# Patient Record
Sex: MALE | Race: WHITE | Employment: UNEMPLOYED | URBAN - METROPOLITAN AREA
[De-identification: names, ages, dates, MRNs, and addresses within clinical notes are randomized per-mention and may not be internally consistent; named-entity substitution may affect disease eponyms.]

---

## 2018-07-02 ENCOUNTER — OFFICE VISIT (OUTPATIENT)
Dept: NEUROLOGY | Age: 23
End: 2018-07-02

## 2018-07-02 VITALS
SYSTOLIC BLOOD PRESSURE: 138 MMHG | DIASTOLIC BLOOD PRESSURE: 84 MMHG | HEIGHT: 67 IN | BODY MASS INDEX: 27.62 KG/M2 | OXYGEN SATURATION: 98 % | WEIGHT: 176 LBS | HEART RATE: 110 BPM

## 2018-07-02 DIAGNOSIS — R68.89: Primary | ICD-10-CM

## 2018-07-02 DIAGNOSIS — R41.9 COGNITIVE COMPLAINTS: ICD-10-CM

## 2018-07-02 RX ORDER — CLOZAPINE 100 MG/1
TABLET ORAL
Refills: 3 | COMMUNITY
Start: 2018-05-15

## 2018-07-02 RX ORDER — LEVOTHYROXINE SODIUM 25 UG/1
TABLET ORAL
COMMUNITY
Start: 2018-06-30

## 2018-07-02 NOTE — PROGRESS NOTES
Name:  Jo Ann Wilkinson      :  1995    PCP:   PROVIDER UNKNOWN      Referring:  Self  MRN:   [id number]    Chief Complaint:   Chief Complaint   Patient presents with    Neurologic Problem     abnormal sensation in head       HISTORY OF PRESENT ILLNESS:     This is a 20 y.o. male with hx of Anxiety, Psychosis, Hypothyroid, who presents for evaluation of abnormal sensation inside head. Mother accompanies patient but he provides history. He describes that he used to have a drug problem, meth, but last used 3-4 years ago. Since then had been diagnosed with Psychosis and is now taking Clozapine. He has been having a popping sensation inside his head for the last 3 years but says what brought him to the Neurologist is that for the past 3-4 months he's been have a burning sensation, \"disintegrating sensation,\" inside his head. He says he's discussed this with multiple providers (including his Psychiatrist) but none of them felt there was anything organically wrong. Patient says he wants to be sure he doesn't have an abscess or infection inside his head. Has had headaches in the past, but this is a different sensation. Complete Review of Systems: + anxiety, chest pain, fatigue, joint pain, memory loss, muscle pain, muscle weakness, rash, tinnitus, stomach pain, dysphagia, visual disturbance; otherwise as noted in HPI     Allergies   Allergen Reactions    Peanut Not Reported This Time    Sulfa (Sulfonamide Antibiotics) Not Reported This Time     Past Medical History:   Diagnosis Date    Anxiety     Psychosis     Thyroid disease      Current Outpatient Prescriptions   Medication Sig Dispense Refill    cloZAPine (CLOZARIL) 100 mg tablet TAKE 3 TABLETS BY MOUTH DAILY  3    levothyroxine (SYNTHROID) 25 mcg tablet        History reviewed. No pertinent surgical history.   Family History   Problem Relation Age of Onset    Stroke Father     Cancer Maternal Grandmother      Social History     Social History    Marital status: SINGLE     Spouse name: N/A    Number of children: N/A    Years of education: N/A     Occupational History    Not on file. Social History Main Topics    Smoking status: Current Every Day Smoker     Types: Cigarettes    Smokeless tobacco: Never Used    Alcohol use Yes      Comment: 6-12 beers/week    Drug use: Not on file    Sexual activity: Not on file     Other Topics Concern    Not on file     Social History Narrative    No narrative on file       PHYSICAL EXAM  Vitals:    07/02/18 0812   BP: 138/84   Pulse: (!) 110   SpO2: 98%   Weight: 79.8 kg (176 lb)   Height: 5' 7\" (1.702 m)       General:  Alert, cooperative, NAD     Head:  Normocephalic, atraumatic. Neck: supple, complete range of motion     Eyes:  Conjunctivae/corneas clear   Lungs:  Heart:  Non labored breathing  Mild tachycardia; regular rhythm    Extremities: No edema. Skin: No rashes    Neurologic Exam       Language: normal  Memory:  Alert, oriented to person, place, situation    Cranial Nerves:  I: smell Not tested   II: visual fields Full to confrontation   II: pupils Equal, round, reactive to light   II: optic disc No papilledema on right; difficult to visualize on left due to frequent eye blinking     III,VII: ptosis none   III,IV,VI: extraocular muscles  normal   V: facial light touch sensation  normal   VII: facial muscle function  symmetric   VIII: hearing symmetric   IX: soft palate elevation  Cannot visualize; narrow oropharynx.     XI: sternocleidomastoid strength 5/5   XII: tongue  midline      Motor: normal bulk, tone, strength in all exts  Sensory: intact to LT, PP, temp, vibration x 4 exts   Cerebellar: no rest, postural, or intention tremor  Normal FNF and H-Shin bilaterally  Reflexes: 2+ throughout  Plantar response: neutral bilaterally    Gait: normal gait including tandem  Romberg negative     No outside clinic notes/ imaging reports available for review    ASSESSMENT AND PLAN    ICD-10-CM ICD-9-CM    1. Sensory problem of head R68.89 V48.4 MRI BRAIN W WO CONT   2. Cognitive complaints R41.9 799.59 MRI BRAIN W WO CONT       20 y.o. male with remote hx of drug abuse (last used 4 yrs ago). On Clozapine for psychosis. C/o abnormal sensations in brain. D/w patient and mother that anatomically, we cannot feel problems inside the brain itself though the coverings of the brain have sensory receptors that can relay pain. Will check MRI Brain +/- contrast to rule out structural abnormalities such as abscess, given the history of drug abuse. F/u in 3-4 weeks to go over MRI results.

## 2018-07-02 NOTE — PROGRESS NOTES
Patient states he thinks he has a brain disease. He states it is hard to explain but says \"it feels like something is eating away at my brain\". He has a sharp twinge of pain at times and sometimes burning sensation.

## 2018-07-02 NOTE — MR AVS SNAPSHOT
[address] [facility] [address] [facility] [address] [phone] Patient: Lesvia Madden MRN: [id number] XOCHITL:[birth year] Visit Information Date & Time Provider Department Dept. Phone Encounter #  
 7/2/2018  8:00 AM John Uribe MD [facility] [phone] [id number] Follow-up Instructions Return in about 3 weeks (around 7/23/2018). Upcoming Health Maintenance Date Due DTaP/Tdap/Td series (1 - Tdap) 9/3/2015 Influenza Age 5 to Adult 8/1/2018 Allergies as of 7/2/2018  Review Complete On: 7/2/2018 By: Nima Flores LPN Severity Noted Reaction Type Reactions Peanut  07/02/2018    Not Reported This Time  
 Sulfa (Sulfonamide Antibiotics)    Not Reported This Time Current Immunizations  Never Reviewed No immunizations on file. Not reviewed this visit You Were Diagnosed With   
  
 Codes Comments Sensory problem of head    -  Primary ICD-10-CM: R68.89 ICD-9-CM: V48.4 Cognitive complaints     ICD-10-CM: R41.9 ICD-9-CM: 799.59 Vitals BP Pulse Height(growth percentile) Weight(growth percentile) SpO2 BMI  
 138/84 (!) 110 5' 7\" (1.702 m) 176 lb (79.8 kg) 98% 27.57 kg/m2 Smoking Status Current Every Day Smoker Vitals History BMI and BSA Data Body Mass Index Body Surface Area  
 27.57 kg/m 2 1.94 m 2 Preferred Pharmacy [facility] Phone [facility] [address] [phone] Your Updated Medication List  
  
   
This list is accurate as of 7/2/18  8:38 AM.  Always use your most recent med list.  
  
  
  
  
 cloZAPine 100 mg tablet Commonly known as:  CLOZARIL  
TAKE 3 TABLETS BY MOUTH DAILY  
  
 levothyroxine 25 mcg tablet Commonly known as:  SYNTHROID Follow-up Instructions Return in about 3 weeks (around 7/23/2018). To-Do List   
 07/02/2018 Imaging:  MRI BRAIN W WO CONT Introducing [facility] & HEALTH SERVICES! [employer] introduces [employer] patient portal. Now you can access parts of your medical record, email your doctor's office, and request medication refills online. 1. In your internet browser, go to [email or web address] [email or web address] 2. Click on the First Time User? Click Here link in the Sign In box. You will see the New Member Sign Up page. 3. Enter your [employer] Access Code exactly as it appears below. You will not need to use this code after youve completed the sign-up process. If you do not sign up before the expiration date, you must request a new code. · [employer] Access Code: [email or web address] Expires: 9/30/2018  8:38 AM 
 
4. Enter the last four digits of your Social Security Number (xxxx) and Date of Birth (mm/dd/yyyy) as indicated and click Submit. You will be taken to the next sign-up page. 5. Create a Net Orange ID. This will be your Net [employer] login ID and cannot be changed, so think of one that is secure and easy to remember. 6. Create a [employer] password. You can change your password at any time. 7. Enter your Password Reset Question and Answer. This can be used at a later time if you forget your password. 8. Enter your e-mail address. You will receive e-mail notification when new information is available in [address] 9. Click Sign Up. You can now view and download portions of your medical record. 10. Click the Download Summary menu link to download a portable copy of your medical information. If you have questions, please visit the Frequently Asked Questions section of the [employer] website. Remember, [employer] is NOT to be used for urgent needs. For medical emergencies, dial 911. Now available from your iPhone and Android! Please provide this summary of care documentation to your next provider. Your primary care clinician is listed as PROVIDER UNKNOWN.  If you have any questions after today's visit, please call [phone].

## 2018-07-09 ENCOUNTER — HOSPITAL ENCOUNTER (OUTPATIENT)
Dept: MRI IMAGING | Age: 23
Discharge: HOME | End: 2018-07-09
Attending: PSYCHIATRY & NEUROLOGY
Payer: COMMERCIAL

## 2018-07-09 DIAGNOSIS — R68.89: ICD-10-CM

## 2018-07-09 DIAGNOSIS — R41.9 COGNITIVE COMPLAINTS: ICD-10-CM

## 2018-07-09 PROCEDURE — 74011250636 HC RX REV CODE- 250/636: Performed by: PSYCHIATRY & NEUROLOGY

## 2018-07-09 PROCEDURE — 70553 MRI BRAIN STEM W/O & W/DYE: CPT

## 2018-07-09 PROCEDURE — A9585 GADOBUTROL INJECTION: HCPCS | Performed by: PSYCHIATRY & NEUROLOGY

## 2018-07-09 RX ADMIN — GADOBUTROL 7 ML: 604.72 INJECTION INTRAVENOUS at 20:37

## 2018-07-30 ENCOUNTER — OFFICE VISIT (OUTPATIENT)
Dept: NEUROLOGY | Age: 23
End: 2018-07-30

## 2018-07-30 VITALS
BODY MASS INDEX: 27.94 KG/M2 | OXYGEN SATURATION: 98 % | WEIGHT: 178 LBS | DIASTOLIC BLOOD PRESSURE: 86 MMHG | SYSTOLIC BLOOD PRESSURE: 140 MMHG | HEART RATE: 127 BPM | HEIGHT: 67 IN

## 2018-07-30 DIAGNOSIS — R68.89: Primary | ICD-10-CM

## 2018-07-30 NOTE — MR AVS SNAPSHOT
[address] [place] [address] [address] [facility] [address] [facility] [address] [phone] Patient: Tricia Santos MRN: [id number] TBS:[birth year] Visit Information Date & Time Provider Department Dept. Phone Encounter #  
 7/30/2018  2:20 PM MD Amira Lopeza [facility] [phone] [id number] Follow-up Instructions Return if symptoms worsen or fail to improve. Upcoming Health Maintenance Date Due Pneumococcal 19-64 Medium Risk (1 of 1 - PPSV23) 9/3/2013 DTaP/Tdap/Td series (1 - Tdap) 9/3/2015 Influenza Age 5 to Adult 8/1/2018 Allergies as of 7/30/2018  Review Complete On: 7/30/2018 By: Isa Baird LPN Severity Noted Reaction Type Reactions Peanut  07/02/2018    Not Reported This Time  
 Sulfa (Sulfonamide Antibiotics)    Not Reported This Time Current Immunizations  Never Reviewed No immunizations on file. Not reviewed this visit You Were Diagnosed With   
  
 Codes Comments Sensory problem of head    -  Primary ICD-10-CM: R68.89 ICD-9-CM: V48.4 Vitals BP Pulse Height(growth percentile) Weight(growth percentile) SpO2 BMI  
 140/86 (!) 127 5' 7\" (1.702 m) 178 lb (80.7 kg) 98% 27.88 kg/m2 Smoking Status Current Every Day Smoker Vitals History BMI and BSA Data Body Mass Index Body Surface Area  
 27.88 kg/m 2 1.95 m 2 [facility]erred Pharmacy [facility] [address] [phone] Your Updated Medication List  
  
   
This list is accurate as of 7/30/18  2:33 PM.  Always use your most recent med list.  
  
  
  
  
 cloZAPine 100 mg tablet Commonly known as:  CLOZARIL  
TAKE 3 TABLETS BY MOUTH DAILY  
  
 levothyroxine 25 mcg tablet Commonly known as:  SYNTHROID Follow-up Instructions Return if symptoms worsen or fail to improve. Introducing [facility] & HEALTH SERVICES! Dear Denis Farmer: Thank you for requesting a [employer] account. Our records indicate that you already have an active [employer] account. You can access your account anytime at [email or web address] Did you know that you can access your hospital and ER discharge instructions at any time in [employer]? You can also review all of your test results from your hospital stay or ER visit. Additional Information If you have questions, please visit the Frequently Asked Questions section of the [employer] website at [email or web address] Remember, [employer] is NOT to be used for urgent needs. For medical emergencies, dial 911. Now available from your iPhone and Android! Please provide this summary of care documentation to your next provider. Your primary care clinician is listed as NONE. If you have any questions after today's visit, please call [phone].

## 2018-07-30 NOTE — PROGRESS NOTES
Interval HPI:   This is a 20 y.o. male who is following up for     Chief Complaint   Patient presents with    Results     Following up to go over Brain MRI results regarding abnormal sensations inside head (inside brain per patient). See initial note on 7-2 for full details. Reviewed MRI Venus Fernandez report and images with patient and mother. Normal MRI Brain with and without contrast. Pt says he occasionally has the burning sensation inside his head but it's not as often now. He confirms that this isn't a headache complaint. He denies any other neurologic symptoms. Brief ROS:  As above or otherwise negative. Past Medical History:   Diagnosis Date    Anxiety     Psychosis 2015    Thyroid disease        History reviewed. No pertinent surgical history. Family History   Problem Relation Age of Onset    Stroke Father     Cancer Maternal Grandmother        Social History     Social History    Marital status: SINGLE     Spouse name: N/A    Number of children: N/A    Years of education: N/A     Occupational History    Not on file. Social History Main Topics    Smoking status: Current Every Day Smoker     Types: Cigarettes    Smokeless tobacco: Never Used    Alcohol use Yes      Comment: 6-12 beers/week    Drug use: Not on file    Sexual activity: Not on file     Other Topics Concern    Not on file     Social History Narrative     Allergies   Allergen Reactions    Peanut Not Reported This Time    Sulfa (Sulfonamide Antibiotics) Not Reported This Time     Current Outpatient Prescriptions   Medication Sig Dispense Refill    cloZAPine (CLOZARIL) 100 mg tablet TAKE 3 TABLETS BY MOUTH DAILY  3    levothyroxine (SYNTHROID) 25 mcg tablet          Physical Exam  Blood pressure 140/86, pulse (!) 127, height 5' 7\" (1.702 m), weight 80.7 kg (178 lb), SpO2 98 %. No acute distress  Neck: no stiffness  Extremities: no edema    Mental status: Alert and oriented to person, place situation. CNs:  Visual Fields: grossly normal bilaterally  EOM: intact/ conjugate in all directions  Facial strength: symmetric  Hearing: normal to conversation   Sensory: intact light touch    Motor: moves all extremities freely (at least 4+/ 5)  Reflexes: not examined  Gait: not examined    Impression      ICD-10-CM ICD-9-CM    1. Sensory problem of head R68.89 V48.4        Discussed with pt/ mother that MRI Brain is normal  No neurologic etiology to patients intracranial pain/ paresthesia complaints  He expressed understanding and didn't have any questions  Encouraged him to keep working with Psychiatry  No regular neurology follow up needed.

## 2022-03-19 PROBLEM — R41.9 COGNITIVE COMPLAINTS: Status: ACTIVE | Noted: 2018-07-02

## 2022-03-20 PROBLEM — R68.89: Status: ACTIVE | Noted: 2018-07-02

## 2023-01-30 ENCOUNTER — HOSPITAL ENCOUNTER (INPATIENT)
Age: 28
LOS: 5 days | Discharge: HOME | DRG: 750 | End: 2023-02-06
Attending: EMERGENCY MEDICINE | Admitting: INTERNAL MEDICINE
Payer: MEDICAID

## 2023-01-30 DIAGNOSIS — U07.1 LAB TEST POSITIVE FOR DETECTION OF COVID-19 VIRUS: ICD-10-CM

## 2023-01-30 DIAGNOSIS — F22 PARANOIA (PSYCHOSIS) (HCC): Primary | ICD-10-CM

## 2023-01-30 LAB
ALBUMIN SERPL-MCNC: 4.2 G/DL (ref 3.5–5)
ALBUMIN/GLOB SERPL: 1.4 (ref 1.1–2.2)
ALP SERPL-CCNC: 70 U/L (ref 45–117)
ALT SERPL-CCNC: 23 U/L (ref 12–78)
AMPHET UR QL SCN: NEGATIVE
ANION GAP SERPL CALC-SCNC: 14 MMOL/L (ref 5–15)
APPEARANCE UR: CLEAR
AST SERPL-CCNC: 22 U/L (ref 15–37)
BACTERIA URNS QL MICRO: NEGATIVE /HPF
BARBITURATES UR QL SCN: NEGATIVE
BASOPHILS # BLD: 0.1 K/UL (ref 0–0.1)
BASOPHILS NFR BLD: 1 % (ref 0–1)
BENZODIAZ UR QL: NEGATIVE
BILIRUB SERPL-MCNC: 1.9 MG/DL (ref 0.2–1)
BILIRUB UR QL: NEGATIVE
BUN SERPL-MCNC: 12 MG/DL (ref 6–20)
BUN/CREAT SERPL: 12 (ref 12–20)
CALCIUM SERPL-MCNC: 8.6 MG/DL (ref 8.5–10.1)
CANNABINOIDS UR QL SCN: NEGATIVE
CHLORIDE SERPL-SCNC: 100 MMOL/L (ref 97–108)
CO2 SERPL-SCNC: 23 MMOL/L (ref 21–32)
COCAINE UR QL SCN: NEGATIVE
COLOR UR: ABNORMAL
CREAT SERPL-MCNC: 1.03 MG/DL (ref 0.7–1.3)
DIFFERENTIAL METHOD BLD: ABNORMAL
DRUG SCRN COMMENT,DRGCM: NORMAL
EOSINOPHIL # BLD: 0 K/UL (ref 0–0.4)
EOSINOPHIL NFR BLD: 0 % (ref 0–7)
EPITH CASTS URNS QL MICRO: ABNORMAL /LPF
ERYTHROCYTE [DISTWIDTH] IN BLOOD BY AUTOMATED COUNT: 12.1 % (ref 11.5–14.5)
ETHANOL SERPL-MCNC: <10 MG/DL
FLUAV RNA SPEC QL NAA+PROBE: NOT DETECTED
FLUBV RNA SPEC QL NAA+PROBE: NOT DETECTED
GLOBULIN SER CALC-MCNC: 3.1 G/DL (ref 2–4)
GLUCOSE SERPL-MCNC: 85 MG/DL (ref 65–100)
GLUCOSE UR STRIP.AUTO-MCNC: NEGATIVE MG/DL
HCT VFR BLD AUTO: 45.8 % (ref 36.6–50.3)
HGB BLD-MCNC: 15.9 G/DL (ref 12.1–17)
HGB UR QL STRIP: NEGATIVE
IMM GRANULOCYTES # BLD AUTO: 0 K/UL (ref 0–0.04)
IMM GRANULOCYTES NFR BLD AUTO: 0 % (ref 0–0.5)
KETONES UR QL STRIP.AUTO: 40 MG/DL
LEUKOCYTE ESTERASE UR QL STRIP.AUTO: NEGATIVE
LYMPHOCYTES # BLD: 2.3 K/UL (ref 0.8–3.5)
LYMPHOCYTES NFR BLD: 29 % (ref 12–49)
MCH RBC QN AUTO: 31 PG (ref 26–34)
MCHC RBC AUTO-ENTMCNC: 34.7 G/DL (ref 30–36.5)
MCV RBC AUTO: 89.3 FL (ref 80–99)
METHADONE UR QL: NEGATIVE
MONOCYTES # BLD: 1.1 K/UL (ref 0–1)
MONOCYTES NFR BLD: 14 % (ref 5–13)
NEUTS SEG # BLD: 4.5 K/UL (ref 1.8–8)
NEUTS SEG NFR BLD: 56 % (ref 32–75)
NITRITE UR QL STRIP.AUTO: NEGATIVE
NRBC # BLD: 0 K/UL (ref 0–0.01)
NRBC BLD-RTO: 0 PER 100 WBC
OPIATES UR QL: NEGATIVE
PCP UR QL: NEGATIVE
PH UR STRIP: 6 (ref 5–8)
PLATELET # BLD AUTO: 330 K/UL (ref 150–400)
PMV BLD AUTO: 10.7 FL (ref 8.9–12.9)
POTASSIUM SERPL-SCNC: 3.7 MMOL/L (ref 3.5–5.1)
PROT SERPL-MCNC: 7.3 G/DL (ref 6.4–8.2)
PROT UR STRIP-MCNC: NEGATIVE MG/DL
RBC # BLD AUTO: 5.13 M/UL (ref 4.1–5.7)
RBC #/AREA URNS HPF: ABNORMAL /HPF (ref 0–5)
SARS-COV-2, COV2: DETECTED
SODIUM SERPL-SCNC: 137 MMOL/L (ref 136–145)
SP GR UR REFRACTOMETRY: <1.005
UA: UC IF INDICATED,UAUC: ABNORMAL
UROBILINOGEN UR QL STRIP.AUTO: 0.2 EU/DL (ref 0.2–1)
WBC # BLD AUTO: 7.9 K/UL (ref 4.1–11.1)
WBC URNS QL MICRO: ABNORMAL /HPF (ref 0–4)

## 2023-01-30 PROCEDURE — 99285 EMERGENCY DEPT VISIT HI MDM: CPT

## 2023-01-30 PROCEDURE — 80053 COMPREHEN METABOLIC PANEL: CPT

## 2023-01-30 PROCEDURE — 74011000250 HC RX REV CODE- 250: Performed by: PHYSICIAN ASSISTANT

## 2023-01-30 PROCEDURE — 36415 COLL VENOUS BLD VENIPUNCTURE: CPT

## 2023-01-30 PROCEDURE — 87636 SARSCOV2 & INF A&B AMP PRB: CPT

## 2023-01-30 PROCEDURE — 81001 URINALYSIS AUTO W/SCOPE: CPT

## 2023-01-30 PROCEDURE — 74011250636 HC RX REV CODE- 250/636: Performed by: PHYSICIAN ASSISTANT

## 2023-01-30 PROCEDURE — 82077 ASSAY SPEC XCP UR&BREATH IA: CPT

## 2023-01-30 PROCEDURE — 85025 COMPLETE CBC W/AUTO DIFF WBC: CPT

## 2023-01-30 PROCEDURE — 80307 DRUG TEST PRSMV CHEM ANLYZR: CPT

## 2023-01-30 PROCEDURE — 96372 THER/PROPH/DIAG INJ SC/IM: CPT

## 2023-01-30 RX ORDER — LORAZEPAM 1 MG/1
2 TABLET ORAL
Status: DISPENSED | OUTPATIENT
Start: 2023-01-30 | End: 2023-01-31

## 2023-01-30 RX ADMIN — OLANZAPINE 10 MG: 10 INJECTION, POWDER, FOR SOLUTION INTRAMUSCULAR at 13:50

## 2023-01-30 NOTE — ED NOTES
Pt reports it feels like a piece of his body is severed and missing. He also reports it feels like his body is splitting completely in two. Pt does not appear to be receptive to redirection that he is in once piece.  Pt remains in handcuffs with [employer] at bedside

## 2023-01-30 NOTE — ED TRIAGE NOTES
Pt arrived to ED accompanied by [facility] [employer] under ECO. Per [employer], pt's mom petitioned for an ECO d/t pt increased paranoia. Pt has a hx of schizophrenia. Per mom, pt has stopped taking his clozapine d/t having to get his blood drawn frequently and believing they were injecting poison into his blood. Pt apprehensive with staff. Pt states he only eats home made d/t being scared of being poisoned. Pt denies SI/HI/AVH.

## 2023-01-30 NOTE — ED NOTES
Pt would not take verbal command from officer to stay in his bed and in his room. Officer placed patient in forensic restraints.  Left wrist is handcuffed at his wrist and cuffed to bed by officer along with the right wrist handcuffed at wrist and to the bed by to bed by officer

## 2023-01-30 NOTE — ED NOTES
Pt hands bilateral wrist in handcuffs in the front of body. [employer] at bedside. Nursing supervisor and security made aware. Skin around handcuffs is intact, dry, and no signs of redness or breakdown. \"Metal restraint\" in use sign up on door.

## 2023-01-30 NOTE — ED NOTES
Pt refusing to take PO med. Pt stating \"I can't take medicine, they are here to take me\" and \"I wont take that until I speak to a \". Provider aware.

## 2023-01-30 NOTE — ED PROVIDER NOTES
[facility] EMERGENCY DEPT  EMERGENCY DEPARTMENT ENCOUNTER       Pt Name: Gordon Dai  MRN: [id number]  [place] 1995  Date of evaluation: 1/30/2023  Provider: Tiffanie Gomez PA-C   PCP: None  Note Started: 12:55 PM 1/30/23     CHIEF COMPLAINT       Chief Complaint   Patient presents with    Mental Health Problem        HISTORY OF PRESENT ILLNESS: 1 or more elements      History From: Patient and ECO paperwork  HPI Limitations : None     Gordon Dai is a 28 y.o. male who presents as an ECO here for medical clearance. Patient's mother issued a ECO stating that patient had been very paranoid and stopped taking his Clozaril. She states with the Clozaril patient had to get regular lab checks and he started to feel like people were poisoning his blood. She also reports patient called the police recently stating he had blood all over his sheets and that his parents had killed people and were eating them. Police did not take him anywhere as he was not a threat to himself     Nursing Notes were all reviewed and agreed with or any disagreements were addressed in the HPI. REVIEW OF SYSTEMS      Review of Systems   Unable to perform ROS: Mental status change      Positives and Pertinent negatives as per HPI. PAST HISTORY     Past Medical History:  Past Medical History:   Diagnosis Date    Anxiety     Psychosis ([address] 2015    Thyroid disease        Past Surgical History:  No past surgical history on file. Family History:  Family History   Problem Relation Age of Onset    Stroke Father     Cancer Maternal Grandmother        Social History:  Social History     Tobacco Use    Smoking status: Every Day     Types: Cigarettes    Smokeless tobacco: Never   Substance Use Topics    Alcohol use: Yes     Comment: 6-12 beers/week       Allergies:   Allergies   Allergen Reactions    Peanut Not Reported This Time    Sulfa (Sulfonamide Antibiotics) Not Reported This Time       CURRENT MEDICATIONS      Previous Medications CLOZAPINE (CLOZARIL) 100 MG TABLET    TAKE 3 TABLETS BY MOUTH DAILY    LEVOTHYROXINE (SYNTHROID) 25 MCG TABLET           PHYSICAL EXAM      ED Triage Vitals [01/30/23 1135]   ED Encounter Vitals Group      /87      Pulse (Heart Rate) (!) 117      Resp Rate 16      Temp 97.7 °F (36.5 °C)      Temp src       O2 Sat (%) 100 %      Weight 180 lb      Height 5' 7\"        Physical Exam       EMERGENCY DEPARTMENT COURSE and DIFFERENTIAL DIAGNOSIS/MDM   Vitals:    Vitals:    01/30/23 1135 01/30/23 1309   BP: 102/87 (!) 145/78   Pulse: (!) 117 (!) 104   Resp: 16 16   Temp: 97.7 °F (36.5 °C)    SpO2: 100% 100%   Weight: 81.6 kg (180 lb)    Height: 5' 7\" (1.702 m)         Patient was given the following medications:  Medications   LORazepam (ATIVAN) tablet 2 mg (2 mg Oral Refused 1/30/23 1311)   OLANZapine (ZyPREXA) 10 mg in sterile water (preservative free) 2 mL injection (10 mg IntraMUSCular Given 1/30/23 1350)       Chronic Conditions: pyschosis, anxiety    Social Determinants affecting Dx or Tx: None    Records Reviewed (source and summary of external records): Nursing notes and crisis  paperwork. MDM: (CC/HPI Summary, DDx, ED Course, and Reassessment, Tests not done, Shared Decision Making, Pt Expectation of Test or Tx )   Patient presents to the ED as an ECO here for medical clearance. His mother if she ECO due to patient's constant paranoia in the last week and noncompliance with medication. Mom states patient is supposed to be taking Clozaril and has to get regular labs drawn but he feels that he is getting poisoned when he gets his labs drawn so he stopped taking his medication. Patient called the police a few days ago stating his parents were killing people and eating them. Mom states patient has been looking under the bed, in the hallways, outdoors continuously thinking someone is coming to get him or his family. Patient does deny SI/HI as well as auditory hallucinations.   We will get basic labs for medical clearance including COVID. ED Course as of 01/30/23 1623   Mon Jan 30, 2023   1301 Per RN pt getting agitated and is now handcuffed to the bed. Will give a PO dose of ativan as pt states he is willing to take []   1310 SARS-CoV-2 by PCR(!): Detected  Discussed results with Dhruv Saenz she states she will call pt's mom to see if he has any knowledge of pt being previously positive []   1329 Per Dhruv Saenz, pt's mom tested positive 2 wks ago. Pt will likely have to be hold in the ED because he needs a locked unit, behavioral health can not take them until 10 days after positive test date and 2nd floor is not a locked unit []      ED Course User Index  [] Cara Palomo PA-C       CONSULTS: (Who and What was discussed)  None        DIAGNOSTIC RESULTS   LABS:     Recent Results (from the past 12 hour(s))   CBC WITH AUTOMATED DIFF    Collection Time: 01/30/23 12:02 PM   Result Value Ref Range    WBC 7.9 4.1 - 11.1 K/uL    RBC 5.13 4.10 - 5.70 M/uL    HGB 15.9 12.1 - 17.0 g/dL    HCT 45.8 36.6 - 50.3 %    MCV 89.3 80.0 - 99.0 FL    MCH 31.0 26.0 - 34.0 PG    MCHC 34.7 30.0 - 36.5 g/dL    RDW 12.1 11.5 - 14.5 %    PLATELET 095 452 - 568 K/uL    MPV 10.7 8.9 - 12.9 FL    NRBC 0.0 0  WBC    ABSOLUTE NRBC 0.00 0.00 - 0.01 K/uL    NEUTROPHILS 56 32 - 75 %    LYMPHOCYTES 29 12 - 49 %    MONOCYTES 14 (H) 5 - 13 %    EOSINOPHILS 0 0 - 7 %    BASOPHILS 1 0 - 1 %    IMMATURE GRANULOCYTES 0 0.0 - 0.5 %    ABS. NEUTROPHILS 4.5 1.8 - 8.0 K/UL    ABS. LYMPHOCYTES 2.3 0.8 - 3.5 K/UL    ABS. MONOCYTES 1.1 (H) 0.0 - 1.0 K/UL    ABS. EOSINOPHILS 0.0 0.0 - 0.4 K/UL    ABS. BASOPHILS 0.1 0.0 - 0.1 K/UL    ABS. IMM.  GRANS. 0.0 0.00 - 0.04 K/UL    DF AUTOMATED     METABOLIC PANEL, COMPREHENSIVE    Collection Time: 01/30/23 12:02 PM   Result Value Ref Range    Sodium 137 136 - 145 mmol/L    Potassium 3.7 3.5 - 5.1 mmol/L    Chloride 100 97 - 108 mmol/L    CO2 23 21 - 32 mmol/L    Anion gap 14 5 - 15 mmol/L    Glucose 85 65 - 100 mg/dL    BUN 12 6 - 20 MG/DL    Creatinine 1.03 0.70 - 1.30 MG/DL    BUN/Creatinine ratio 12 12 - 20      eGFR >60 >60 ml/min/1.73m2    Calcium 8.6 8.5 - 10.1 MG/DL    Bilirubin, total 1.9 (H) 0.2 - 1.0 MG/DL    ALT (SGPT) 23 12 - 78 U/L    AST (SGOT) 22 15 - 37 U/L    Alk. phosphatase 70 45 - 117 U/L    Protein, total 7.3 6.4 - 8.2 g/dL    Albumin 4.2 3.5 - 5.0 g/dL    Globulin 3.1 2.0 - 4.0 g/dL    A-G Ratio 1.4 1.1 - 2.2     ETHYL ALCOHOL    Collection Time: 01/30/23 12:02 PM   Result Value Ref Range    ALCOHOL(ETHYL),SERUM <10 <10 MG/DL   DRUG SCREEN, URINE    Collection Time: 01/30/23 12:02 PM   Result Value Ref Range    AMPHETAMINES Negative NEG      BARBITURATES Negative NEG      BENZODIAZEPINES Negative NEG      COCAINE Negative NEG      METHADONE Negative NEG      OPIATES Negative NEG      PCP(PHENCYCLIDINE) Negative NEG      THC (TH-CANNABINOL) Negative NEG      Drug screen comment (NOTE)    COVID-19 WITH INFLUENZA A/B    Collection Time: 01/30/23 12:02 PM   Result Value Ref Range    SARS-CoV-2 by PCR Detected (A) NOTD      Influenza A by PCR Not detected      Influenza B by PCR Not detected     URINALYSIS W/ REFLEX CULTURE    Collection Time: 01/30/23 12:07 PM    Specimen: Urine   Result Value Ref Range    Color YELLOW/STRAW      Appearance CLEAR CLEAR      Specific gravity <1.005     pH (UA) 6.0 5.0 - 8.0      Protein Negative NEG mg/dL    Glucose Negative NEG mg/dL    Ketone 40 (A) NEG mg/dL    Bilirubin Negative NEG      Blood Negative NEG      Urobilinogen 0.2 0.2 - 1.0 EU/dL    Nitrites Negative NEG      Leukocyte Esterase Negative NEG      WBC 0-4 0 - 4 /hpf    RBC 0-5 0 - 5 /hpf    Epithelial cells FEW FEW /lpf    Bacteria Negative NEG /hpf    UA:UC IF INDICATED CULTURE NOT INDICATED BY UA RESULT CNI          EKG: When ordered, EKG's are interpreted by the Emergency Department Physician in the absence of a cardiologist.  Please see their note for interpretation of EKG.          PROCEDURES Unless otherwise noted below, none  Procedures     CRITICAL CARE TIME   none    FINAL IMPRESSION     1. Paranoia (psychosis) ([facility] [address]    2. Lab test positive for detection of COVID-19 virus          DISPOSITION/PLAN   [facility]    ED attending involvment: I have seen and evaluated the patient. My supervision physician was available for consultation. I am the Primary Clinician of Record. Nehemias Acuna PA-C (electronically signed)    (Please note that parts of this dictation were completed with voice recognition software. Quite often unanticipated grammatical, syntax, homophones, and other interpretive errors are inadvertently transcribed by the computer software. Please disregards these errors.  Please excuse any errors that have escaped final proofreading.)

## 2023-01-30 NOTE — ED NOTES
Pt appears more awake. Pt requesting to be let out of handcuffs. Noted that pt has a pattern of attempting to run out of ED and will remain in handcuffs. Pt provided hot meal tray and will attempt to get pt to eat and drink .

## 2023-01-30 NOTE — ED NOTES
Verbal shift change report given to Patricia Roper 44 (oncoming nurse) by Lonny Ramsey RN (offgoing nurse). Report included the following information SBAR, Kardex, ED Summary, Procedure Summary, MAR, and Recent Results.

## 2023-01-30 NOTE — ED NOTES
Emergency Department Nursing Plan of Care       The Nursing Plan of Care is developed from the Nursing assessment and Emergency Department Attending provider initial evaluation. The plan of care may be reviewed in the ED Provider note.     The Plan of Care was developed with the following considerations:   Patient / Family readiness to learn indicated by:verbalized understanding  Persons(s) to be included in education: patient  Barriers to Learning/Limitations:No    Signed     Colbert Goldmann, RN    1/30/2023   2:44 PM

## 2023-01-31 PROCEDURE — 74011250637 HC RX REV CODE- 250/637: Performed by: EMERGENCY MEDICINE

## 2023-01-31 RX ORDER — RISPERIDONE 1 MG/ML
2 SOLUTION ORAL 2 TIMES DAILY
Status: DISCONTINUED | OUTPATIENT
Start: 2023-01-31 | End: 2023-02-06 | Stop reason: HOSPADM

## 2023-01-31 RX ORDER — LEVOTHYROXINE SODIUM 25 UG/1
25 TABLET ORAL DAILY
Status: DISCONTINUED | OUTPATIENT
Start: 2023-02-01 | End: 2023-02-06 | Stop reason: HOSPADM

## 2023-01-31 RX ADMIN — RISPERIDONE 2 MG: 1 SOLUTION ORAL at 18:29

## 2023-01-31 RX ADMIN — RISPERIDONE 2 MG: 1 SOLUTION ORAL at 10:26

## 2023-01-31 NOTE — ED NOTES
Verbal shift change report given to [phone] (oncoming nurse) by Nallely Urbina RN (offgoing nurse). Report included the following information SBAR, Kardex, ED Summary, Procedure Summary, MAR, and Recent Results.

## 2023-01-31 NOTE — ED NOTES
Pt in bed watching tv jingling his cuffs; when asked if he needs medications to calm him down pt states, \"Just leave me to listen to the tv alone. \"  Pt bilateral cuffs to L and R wrist. No signs of injury noted

## 2023-01-31 NOTE — BSMART NOTE
BSMART Liaison Team Note     LOS:  0     Patient goal(s) for today: take medication as prescribed, make needs known in an appropriate manner, utilize coping skills  BSMART Liaison team focus/goals: assess needs, provide support and education, coordinate with care management    Progress note: Patient assessed face to face. He was observed laying in bed with his left wrist handcuffed to the bed. Patient reports that he does not understand why he needs to be handcuffed to the bed if he is in his cell. He was reminded he was in the hospital but he continues to refer to the ER room as a cell. He is a poor historian and is tangential and disorganized. He does not always answer questions appropriately. He does report that he has had a condition for 16 years but is unable to verbalize his diagnosis. He reports medications and taking them then reports not taking them because he forgets or does not like them. Patient denies SI, HI, and hallucinations then reports that he hears voices all the time and asked for books to read to try to quiet the voices down. Patient is unable to give medication names, provider names, or diagnosis. When asked why he is at the hospital he reports that he knows he needed to get some treatment. Discussed case with Larissa at [facility]. She reports mother reported that patient was seeing Dr Olga Painter most recently (over 6 months ago). Last prescription was for risperidone and patient reported feeling dizzy but did not take it very long to see if it worked or if he felt better. This writer discussed with Dr Miguel Colby and Dr Fan Zayas. Medication discussed for daily and PRN use. Dr Fan Zayas and/or Dr Miguel Colby to order medications. Barriers to Discharge: COVID+, CTC TDO  Guns in the home: unknown     Outpatient provider(s):  None at this time.  Previously Dr Olga Painter but this was more than 6 months ago  Insurance info/prescription coverage:  unknown    Diagnosis: Psychosis NOS  Past Medical History: Diagnosis Date    Anxiety     Psychosis ([facility] [address] 2015    Thyroid disease         Plan:  [facility] to continue bed searching. CTC bed searching during the day. Due to COVID+ status, patient is exclusionary for all facilities. Dr Nadeem Ball to start medication and order PRNs. Liaison to team to follow daily while admitted to [facility]. Follow up Psych Consult placed? no.   Psychiatrist updated?  yes       Participating treatment team members: Jahaira Crane, Dr Mikhail Luz MD

## 2023-01-31 NOTE — ED NOTES
RN spoke to [facility] regarding update. Still working on a bed search for pt but my be difficult d/t him having Covid. Per [facility] psych MD will re-evaluate him tomorrow morning.

## 2023-01-31 NOTE — ED NOTES
Pt's left wrist in a handcuff attached to the stretcher. [employer] at bedside. Nursing supervisor and security made aware. Skin around handcuffs is intact, dry, and no signs of redness or breakdown. \"Metal restraint\" in use sign up on door.

## 2023-01-31 NOTE — CONSULTS
PSYCHIATRY CONSULT NOTE:    REASON FOR CONSULT:  psychotic behavior  Delusional    HISTORY OF PRESENTING COMPLAINT:  Elissa Castorena is a 28 y.o. male who presents as an ECO here for medical clearance. Patient's mother issued a ECO stating that patient had been very paranoid and stopped taking his Clozaril. She states with the Clozaril patient had to get regular lab checks and he started to feel like people were poisoning his blood. She also reports patient called the police recently stating he had blood all over his sheets and that his parents had killed people and were eating them. Police did not take him anywhere as he was not a threat to himself    Elissa Castorena reports feeling that he could not eat with his hand cuffed to the bed (food partially eaten). Poor historian, he believes he is in a cell due to the hand cuffs. Notes dealing with mental health concerns for 16+ years. Denies SI/HI/AH/VH. Wants to read a Bible and any books to quiet the voice he occasionally hears. No aggression or violence. Appropriately interactive and limited awareness. Tolerating medications well. Eating and sleeping fairly. PAST PSYCHIATRIC HISTORY:  In Patient Paranoid Schizophrenia on Clozaril. Followed outpatient with Dr. Governor Valerio over 6 months ago.     SUBSTANCE ABUSE HISTORY:  Social History     Substance and Sexual Activity   Drug Use Not on file     Social History     Substance and Sexual Activity   Alcohol Use Yes    Comment: 6-12 beers/week     Social History     Tobacco Use   Smoking Status Every Day    Types: Cigarettes   Smokeless Tobacco Never       PAST MEDICAL HISTORY:  Past Medical History:   Diagnosis Date    Anxiety     Psychosis ([facility] [address] 2015    Thyroid disease        SOCIAL HISTORY:  Lives with mother    VITALS:  Visit Vitals  /71   Pulse 99   Temp 97.7 °F (36.5 °C)   Resp 20   Ht 5' 7\" (1.702 m)   Wt 81.6 kg (180 lb)   SpO2 100%   BMI 28.19 kg/m²       MEDICATIONS:    Current Facility-Administered Medications:     risperiDONE (RisperDAL) 1 mg/mL oral solution soln 2 mg, 2 mg, Oral, BID, Florencio Wylie MD, 2 mg at 01/31/23 1026    Current Outpatient Medications:     cloZAPine (CLOZARIL) 100 mg tablet, TAKE 3 TABLETS BY MOUTH DAILY, Disp: , Rfl: 3    levothyroxine (SYNTHROID) 25 mcg tablet, , Disp: , Rfl:     MENTAL STATUS EXAM:  Calm and cooperative by disorganized. Person/place only  Denies SI/HI/AH/VH  Disorganized    ASSESSMENT AND PLAN:  Paranoid Schizophrenia, Deferred , Problems related to social environment  and 41-50 serious symptoms    RECOMMENDATIONS:  Not a candidate for inpatient psychiatric unit at this time due to his COVID status. Discontinue 1:1 nursing  Restart home medications accept Clozaril.   Risperdal liquid 2 mg PO BID  Thank you for this consultation    Kvng Paige MD  1/31/2023

## 2023-01-31 NOTE — ED NOTES
Bedside shift change report given to Veronica Mills (oncoming nurse) by Pedro Hoang RN (offgoing nurse). Report included the following information SBAR, Kardex, ED Summary, [facility] and Recent Results.

## 2023-02-01 PROBLEM — F23 ACUTE PSYCHOSIS (HCC): Status: ACTIVE | Noted: 2023-02-01

## 2023-02-01 PROBLEM — U07.1 COVID-19: Status: ACTIVE | Noted: 2023-02-01

## 2023-02-01 PROCEDURE — 74011250637 HC RX REV CODE- 250/637: Performed by: PSYCHIATRY & NEUROLOGY

## 2023-02-01 PROCEDURE — 65270000032 HC RM SEMIPRIVATE

## 2023-02-01 PROCEDURE — 74011250637 HC RX REV CODE- 250/637: Performed by: EMERGENCY MEDICINE

## 2023-02-01 RX ORDER — ENOXAPARIN SODIUM 100 MG/ML
40 INJECTION SUBCUTANEOUS DAILY
Status: DISCONTINUED | OUTPATIENT
Start: 2023-02-02 | End: 2023-02-03

## 2023-02-01 RX ORDER — ACETAMINOPHEN 325 MG/1
650 TABLET ORAL
Status: DISCONTINUED | OUTPATIENT
Start: 2023-02-01 | End: 2023-02-06 | Stop reason: HOSPADM

## 2023-02-01 RX ORDER — ACETAMINOPHEN 650 MG/1
650 SUPPOSITORY RECTAL
Status: DISCONTINUED | OUTPATIENT
Start: 2023-02-01 | End: 2023-02-06 | Stop reason: HOSPADM

## 2023-02-01 RX ORDER — ONDANSETRON 4 MG/1
4 TABLET, ORALLY DISINTEGRATING ORAL
Status: DISCONTINUED | OUTPATIENT
Start: 2023-02-01 | End: 2023-02-06 | Stop reason: HOSPADM

## 2023-02-01 RX ORDER — SODIUM CHLORIDE 0.9 % (FLUSH) 0.9 %
5-40 SYRINGE (ML) INJECTION EVERY 8 HOURS
Status: DISCONTINUED | OUTPATIENT
Start: 2023-02-01 | End: 2023-02-01

## 2023-02-01 RX ORDER — SODIUM CHLORIDE 0.9 % (FLUSH) 0.9 %
5-40 SYRINGE (ML) INJECTION AS NEEDED
Status: DISCONTINUED | OUTPATIENT
Start: 2023-02-01 | End: 2023-02-06 | Stop reason: HOSPADM

## 2023-02-01 RX ORDER — POLYETHYLENE GLYCOL 3350 17 G/17G
17 POWDER, FOR SOLUTION ORAL DAILY PRN
Status: DISCONTINUED | OUTPATIENT
Start: 2023-02-01 | End: 2023-02-06 | Stop reason: HOSPADM

## 2023-02-01 RX ORDER — ONDANSETRON 2 MG/ML
4 INJECTION INTRAMUSCULAR; INTRAVENOUS
Status: DISCONTINUED | OUTPATIENT
Start: 2023-02-01 | End: 2023-02-06 | Stop reason: HOSPADM

## 2023-02-01 RX ADMIN — RISPERIDONE 2 MG: 1 SOLUTION ORAL at 10:28

## 2023-02-01 RX ADMIN — LEVOTHYROXINE SODIUM 25 MCG: 25 TABLET ORAL at 10:00

## 2023-02-01 NOTE — ED NOTES
Verbal shift change report given to Cary Jiménez RN (oncoming nurse) by Jose Pedraza (offgoing nurse). Report included the following information SBAR, ED Summary, MAR and Recent Results.

## 2023-02-01 NOTE — BSMART NOTE
BSMART Liaison Team Note     LOS:  45 hours     Patient goal(s) for today: take medication as prescribed, make needs known in an appropriate manner, utilize coping skills  BSMART Liaison team focus/goals: assess needs, provide support and education, coordinate with care management    Progress note: Patient assessed face to face with Dr Roseanna Velasco present and [facility] in [facility]. Patient reports that he slept and is eating. He reports that he has been taking the medication and has no concerns or issues with the liquid Risperdal. He reports that he needs to get himself together because he needs to get back to work. He reports no thoughts of hurting himself or others. He denies hallucinations. He responded appropriately to Dr Roseanna Velasco but when this writer attempted to talk as well he reported being overwhelmed and needing to get his thoughts together and could only talk to 1 person at a time. He was pleasant and able to voice his distress at being involved in a 3 way conversation. He was explained that he would have his commitment hearing today and what this meant. He denies any issues or concerns. He reports wanting to make sure to follow up outpatient with Dr Frankey Media when he is able to go home. Dr Roseanna Velasco spoke with Dr Frankey Media by phone. Discussed medication history. Per Dr Frankey Media no history of violence. Treatment team spoke with crisis clinician Larissa about discussions with mother. Patient has a strong support system and his mother is his primary support person. She reports that the parents are willing to come to the hospital to stay during the day if it will help keep the patient calm during medical floor admission. Will let medical floor know and decide as patient had delusions regarding his parents which were part of the reason for admission. Dr Roseanna Velasco reports that he juan a accept the patient to the [facility] ([place], [place]) if the patient was not COVID+.  He was agreeable to admission to the medical floor if the medical floor physician agreed. Informed ER staff of Dr Rajendra Conner being in agreement with admission once committed. Discussed case with Dr Myesha Ayala (hospitalist) and Dr Rajendra Conner. She is aware that Dr Rajendra Conner is willing to accept the patient to medical floor as a psychiatric patient under isolation if she is agreeable. Care conference scheduled for 11:30am to talk to both medical floor and ER staff. Barriers to Discharge: COVID+, CTC TDO  Guns in the home: unknown      Outpatient provider(s):  None at this time. Previously Dr Jerson Varma but this was more than 6 months ago  Insurance info/prescription coverage:  unknown     Diagnosis: Schizophrenia vs Schizoaffective Disorder       Past Medical History:   Diagnosis Date    Anxiety      Psychosis ([facility] [address] 2015    Thyroid disease           Plan:  [facility] bed searching during the day. Due to COVID+ status, patient is exclusionary for all facilities. Dr Rajendra Conner to start medication and order PRNs. Liaison to team to follow daily while admitted to [facility]. TDO commitment hearing today. Follow up Psych Consult placed? no.   Psychiatrist updated?  yes         Participating treatment team members: Selina Thomas, Dr Ruth Oneill MD

## 2023-02-01 NOTE — ED NOTES
Pt ambulated to restroom,  standing at restroom door  [id number] - pt returned to room and officer did not replace forensic restraint on pt.

## 2023-02-01 NOTE — ED NOTES
TRANSFER - OUT REPORT:    Verbal report given to Yordy Streeter RN (name) on Jose Frausto  being transferred to [address] [place] [address] Dillon Trinidad, room 2 (unit) for routine progression of care       Report consisted of patients Situation, Background, Assessment and   Recommendations(SBAR). Information from the following report(s) SBAR, ED Summary, [facility] and Recent Results was reviewed with the receiving nurse. Lines:       Opportunity for questions and clarification was provided.       Patient transported with:   Don Leija

## 2023-02-01 NOTE — ED NOTES
Bedside shift change report given to Luis A Black (oncoming nurse) by Syd Lennon (offgoing nurse). Report included the following information SBAR, Kardex, ED Summary, [facility] and Recent Results.

## 2023-02-01 NOTE — CONSULTS
PSYCHIATRY CONSULT NOTE:    REASON FOR CONSULT:  psychotic behavior  Delusional    HISTORY OF PRESENTING COMPLAINT:  Sagar Brasher is a 28 y.o. male who presents as an ECO here for medical clearance. Patient's mother issued a ECO stating that patient had been very paranoid and stopped taking his Clozaril. She states with the Clozaril patient had to get regular lab checks and he started to feel like people were poisoning his blood. She also reports patient called the police recently stating he had blood all over his sheets and that his parents had killed people and were eating them. Police did not take him anywhere as he was not a threat to himself    Sagar Brasher reports feeling that he could not eat with his hand cuffed to the bed (food partially eaten). Poor historian, he believes he is in a cell due to the hand cuffs. Notes dealing with mental health concerns for 16+ years. Denies SI/HI/AH/VH. Wants to read a Bible and any books to quiet the voice he occasionally hears. No aggression or violence. Appropriately interactive and limited awareness. Tolerating medications well. Eating and sleeping fairly. 2/1 - Sagar Brasher Is a little better today and reports feeling well and moods are good. Mild and guarded and mildly suspicious. Denies SI/HI/AH/VH. No aggression or violence. Appropriately interactive and aware. Tolerating medications well. Eating better as yesterday he ate nothing and sleeping fairly. Wants to get back work. Spoke with Dr. Marie Bee who notes he did best on Clozaril, but faired well with Risperdal.  Discussed his fears with blood draws from Clozaril. Ok with current treatment plan. PAST PSYCHIATRIC HISTORY:  In Patient Paranoid Schizophrenia on Clozaril. Followed outpatient with Dr. Marie Bee over 6 months ago.     SUBSTANCE ABUSE HISTORY:  Social History     Substance and Sexual Activity   Drug Use Not on file     Social History     Substance and Sexual Activity   Alcohol Use Yes    Comment: 6-12 beers/week     Social History     Tobacco Use   Smoking Status Every Day    Types: Cigarettes   Smokeless Tobacco Never       PAST MEDICAL HISTORY:  Past Medical History:   Diagnosis Date    Anxiety     Psychosis ([facility] [address] 2015    Thyroid disease        SOCIAL HISTORY:  Lives with Mother and father where mother is primary supporter. VITALS:  Visit Vitals  /80 (BP 1 Location: Right upper arm, BP Patient Position: At rest)   Pulse (!) 104   Temp 98.3 °F (36.8 °C)   Resp 19   Ht 5' 7\" (1.702 m)   Wt 81.6 kg (180 lb)   SpO2 99%   BMI 28.19 kg/m²       MEDICATIONS:    Current Facility-Administered Medications:     risperiDONE (RisperDAL) 1 mg/mL oral solution soln 2 mg, 2 mg, Oral, BID, Jessica Sheppard MD, 2 mg at 01/31/23 1829    levothyroxine (SYNTHROID) tablet 25 mcg, 25 mcg, Oral, DAILY, Rachael Higgins MD, 25 mcg at 02/01/23 1000    Current Outpatient Medications:     cloZAPine (CLOZARIL) 100 mg tablet, TAKE 3 TABLETS BY MOUTH DAILY, Disp: , Rfl: 3    levothyroxine (SYNTHROID) 25 mcg tablet, , Disp: , Rfl:     MENTAL STATUS EXAM:  Calm and cooperative by disorganized. Person/place only  Denies SI/HI/AH/VH  Disorganized    ASSESSMENT AND PLAN:  Paranoid Schizophrenia, Deferred , Problems related to social environment  and 41-50 serious symptoms    RECOMMENDATIONS:  Not a candidate for inpatient psychiatric unit at this time due to his COVID status. Requires psychiatric treatment  and will need medical admission for isolation.   Continue Risperdal liquid 2 mg PO BID  Thyroid panel  Thank you for this consultation    Olamide Mckinney MD  2/1/2023

## 2023-02-01 NOTE — H&P
[address] [facility] Adult  Hospitalist Group  History and Physical    Date of Service:  2/1/2023  Primary Care Provider: None  Source of information: The patient    Chief Complaint: Mental Health Problem      History of Presenting Illness:   Rayray Holcomb is a 28 y.o. male is known past medical history of paranoid schizophrenia, hypothyroidism who presents to ED with acute psychosis, psychotic behavior, delusional due to patient stopped taking his home medication Clozaril. In the emergency department patient was evaluated, psychiatrist was consulted, recommended acute psychiatric admission. During work-up COVID test came back positive. The patient denied having any fever, shortness of breath, productive cough. He does not require any supplemental oxygen. Per psychiatry patient cannot be admitted to acute psychiatric unit due to he is COVID-positive and required to be on isolation. The patient does have auditory hallucination but denied-continue suicidal ideation. Medicine was consulted for admission and further evaluation. The patient denies any headache, blurry vision, sore throat, trouble swallowing, trouble with speech, chest pain, SOB, cough, fever, chills, N/V/D, abd pain, urinary symptoms, constipation, recent travels, sick contacts, focal or generalized neurological symptoms, falls, injuries, rashes, hematemesis, melena, hemoptysis, hematuria, rashes, denies starting any new medications and denies any other concerns or problems besides as mentioned above. REVIEW OF SYSTEMS:  A comprehensive review of systems was negative except for that written in the History of Present Illness. Past Medical History:   Diagnosis Date    Anxiety     Psychosis ([facility] [address] 2015    Thyroid disease       No past surgical history on file. Prior to Admission medications    Medication Sig Start Date End Date Taking?  Authorizing Provider   cloZAPine (CLOZARIL) 100 mg tablet TAKE 3 TABLETS BY MOUTH DAILY 5/15/18   Provider, Historical   levothyroxine (SYNTHROID) 25 mcg tablet  6/30/18   Provider, Historical     Allergies   Allergen Reactions    Peanut Not Reported This Time    Sulfa (Sulfonamide Antibiotics) Not Reported This Time      Family History   Problem Relation Age of Onset    Stroke Father     Cancer Maternal Grandmother       Social History:  reports that he has been smoking cigarettes. He has never used smokeless tobacco. He reports current alcohol use. Social Determinants of Health     Tobacco Use: High Risk    Smoking Tobacco Use: Every Day    Smokeless Tobacco Use: Never    Passive Exposure: Not on file   Alcohol Use: Not on file   Financial Resource Strain: Not on file   Food Insecurity: Not on file   Transportation Needs: Not on file   Physical Activity: Not on file   Stress: Not on file   Social Connections: Not on file   Intimate Partner Violence: Not on file   Depression: Not on file   Housing Stability: Not on file        Medications were reconciled to the best of my ability given all available resources at the time of admission. Route is PO if not otherwise noted. Family and social history were personally reviewed, all pertinent and relevant details are outlined as above.     Objective:   Visit Vitals  /80 (BP 1 Location: Right upper arm, BP Patient Position: At rest)   Pulse (!) 104   Temp 98.3 °F (36.8 °C)   Resp 19   Ht 5' 7\" (1.702 m)   Wt 81.6 kg (180 lb)   SpO2 99%   BMI 28.19 kg/m²      O2 Device: None (Room air)    PHYSICAL EXAM:   General: Alert x oriented x 3, awake, no acute distress,   HEENT: PEERL, EOMI, moist mucus membranes  Neck: Supple, no JVD, no meningeal signs  Chest: Clear to auscultation bilaterally   CVS: RRR, S1 S2 heard, no murmurs/rubs/gallops  Abd: Soft, non-tender, non-distended, +bowel sounds   Ext: No clubbing, no cyanosis, no edema  Neuro/Psych: Pleasant mood and affect, CN 2-12 grossly intact, sensory grossly within normal limit, Strength 5/5 in all extremities, DTR 1+ x 4  Cap refill: Brisk, less than 3 seconds  Pulses: 2+, symmetric in all extremities  Skin: Warm, dry, without rashes or lesions    Data Review:   I have independently reviewed and interpreted patient's lab and all other diagnostic data    Abnormal Labs Reviewed   COVID-19 WITH INFLUENZA A/B - Abnormal; Notable for the following components:       Result Value    SARS-CoV-2 by PCR Detected (*)     All other components within normal limits   CBC WITH AUTOMATED DIFF - Abnormal; Notable for the following components:    MONOCYTES 14 (*)     ABS. MONOCYTES 1.1 (*)     All other components within normal limits   METABOLIC PANEL, COMPREHENSIVE - Abnormal; Notable for the following components:    Bilirubin, total 1.9 (*)     All other components within normal limits   URINALYSIS W/ REFLEX CULTURE - Abnormal; Notable for the following components:    Ketone 40 (*)     All other components within normal limits       All Micro Results       Procedure Component Value Units Date/Time    COVID-19 WITH INFLUENZA A/B [[id number]]  (Abnormal) Collected: 01/30/23 1202    Order Status: Completed Specimen: Nasopharyngeal Updated: 01/30/23 1304     SARS-CoV-2 by PCR Detected        Comment: CALLED TO AND READ BACK BY  NICK BARRIENTOS AT 1304 LTN  Positive results are indicative of the presence of SARS-CoV-2. Clinical correlation with patient history and other diagnostic information is necessary to determine patient infection status. Positive results do not rule out bacterial infection or co-infection with other pathogens. Influenza A by PCR Not detected        Influenza B by PCR Not detected        Comment: NOTE: Influenza A and Influenza B negative results should be considered presumptive in samples that have a positive SARS-CoV-2 result. Consider re-testing with an alternate FDA-approved test if clinically indicated.        Testing was performed using amarilis Leny SARS-CoV-2 and Influenza A/B nucleic acid assay.  This test is a multiplex Real-Time Reverse Transcriptase Polymerase Chain Reaction (RT-PCR) based in vitro diagnostic test intended for the qualitative detection of nucleic acids from SARS-CoV-2, Influenza A, and Influenza B in nasopharyngeal and nasal swab specimens for use under the FDA's Emergency Use Authorization (EUA) only. Fact sheet for Patients: FindDrives.pl  Fact sheet for Healthcare Providers: FindDrives.pl                 IMAGING:   No orders to display        ECG/ECHO:  No results found for this or any previous visit. Notes reviewed from all clinical/nonclinical/nursing services involved in patient's clinical care. Care coordination discussions were held with appropriate clinical/nonclinical/ nursing providers based on care coordination needs. Assessment:   Given the patient's current clinical presentation, there is a high level of concern for decompensation if discharged from the emergency department. Complex decision making was performed, which includes reviewing the patient's available past medical records, laboratory results, and imaging studies. Active Problems:    Acute psychosis ([facility] [address] (2/1/2023)      COVID-19 (2/1/2023)    Paranoid schizophrenia with acute psychosis due to noncompliance with medication. Hyper thyroidism. Plan:     The patient will be admitted to medicine, medical floor is inpatient and will require at least 2 midnight for inpatient treatment. The patient will be placed on isolation. Currently patient asymptomatic, does not require any supplemental oxygen, he is afebrile, he does not experience any shortness of breath or cough, he he does not require any antiviral treatment. Psychiatrist was consulted, discussed care. Per Risperdal was initiated. The patient is requiring acute psychiatric care and should be transferred to acute psychiatric unit after completion of quarantine.    Per psychiatrist Rustam was initiated. Psychiatrist will be following And will be reconsulted on daily basis. Home medication will be reinstated after verification and reconciliation completed by pharmacy. For hypothyroidism levothyroxine will be continued. DIET: ADULT DIET Regular   ISOLATION PRECAUTIONS: There are currently no Active Isolations  CODE STATUS: Full Code   DVT PROPHYLAXIS: Lovenox  FUNCTIONAL STATUS PRIOR TO HOSPITALIZATION: Fully active and ambulatory; able to carry on all self-care without restriction. Ambulatory status/function: By self   EARLY MOBILITY ASSESSMENT: Recommend routine ambulation while hospitalized with the assistance of nursing staff  ANTICIPATED DISCHARGE: 24-48 hours. ANTICIPATED DISPOSITION: Home  EMERGENCY CONTACT/SURROGATE DECISION MAKER: Regulo Tay (Mother)    CRITICAL CARE WAS PERFORMED FOR THIS ENCOUNTER: NO.      Signed By: Shalini Field MD     February 1, 2023         Please note that this dictation may have been completed with Dragon, the [employer] voice recognition software. Quite often unanticipated grammatical, syntax, homophones, and other interpretive errors are inadvertently transcribed by the computer software. Please disregard these errors. Please excuse any errors that have escaped final proofreading.

## 2023-02-02 LAB
ALBUMIN SERPL-MCNC: 3.8 G/DL (ref 3.5–5)
ALBUMIN/GLOB SERPL: 1.2 (ref 1.1–2.2)
ALP SERPL-CCNC: 71 U/L (ref 45–117)
ALT SERPL-CCNC: 19 U/L (ref 12–78)
ANION GAP SERPL CALC-SCNC: 8 MMOL/L (ref 5–15)
AST SERPL-CCNC: 15 U/L (ref 15–37)
BASOPHILS # BLD: 0 K/UL (ref 0–0.1)
BASOPHILS NFR BLD: 1 % (ref 0–1)
BILIRUB SERPL-MCNC: 1.2 MG/DL (ref 0.2–1)
BUN SERPL-MCNC: 8 MG/DL (ref 6–20)
BUN/CREAT SERPL: 9 (ref 12–20)
CALCIUM SERPL-MCNC: 8.5 MG/DL (ref 8.5–10.1)
CHLORIDE SERPL-SCNC: 105 MMOL/L (ref 97–108)
CO2 SERPL-SCNC: 26 MMOL/L (ref 21–32)
CREAT SERPL-MCNC: 0.86 MG/DL (ref 0.7–1.3)
DIFFERENTIAL METHOD BLD: NORMAL
EOSINOPHIL # BLD: 0.1 K/UL (ref 0–0.4)
EOSINOPHIL NFR BLD: 1 % (ref 0–7)
ERYTHROCYTE [DISTWIDTH] IN BLOOD BY AUTOMATED COUNT: 12 % (ref 11.5–14.5)
GLOBULIN SER CALC-MCNC: 3.1 G/DL (ref 2–4)
GLUCOSE SERPL-MCNC: 106 MG/DL (ref 65–100)
HCT VFR BLD AUTO: 45.2 % (ref 36.6–50.3)
HGB BLD-MCNC: 15.4 G/DL (ref 12.1–17)
IMM GRANULOCYTES # BLD AUTO: 0 K/UL (ref 0–0.04)
IMM GRANULOCYTES NFR BLD AUTO: 0 % (ref 0–0.5)
LACTATE SERPL-SCNC: 1 MMOL/L (ref 0.4–2)
LYMPHOCYTES # BLD: 3.2 K/UL (ref 0.8–3.5)
LYMPHOCYTES NFR BLD: 39 % (ref 12–49)
MAGNESIUM SERPL-MCNC: 2.2 MG/DL (ref 1.6–2.4)
MCH RBC QN AUTO: 30.3 PG (ref 26–34)
MCHC RBC AUTO-ENTMCNC: 34.1 G/DL (ref 30–36.5)
MCV RBC AUTO: 88.8 FL (ref 80–99)
MONOCYTES # BLD: 0.9 K/UL (ref 0–1)
MONOCYTES NFR BLD: 10 % (ref 5–13)
NEUTS SEG # BLD: 4 K/UL (ref 1.8–8)
NEUTS SEG NFR BLD: 49 % (ref 32–75)
NRBC # BLD: 0 K/UL (ref 0–0.01)
NRBC BLD-RTO: 0 PER 100 WBC
PLATELET # BLD AUTO: 283 K/UL (ref 150–400)
PMV BLD AUTO: 10.8 FL (ref 8.9–12.9)
POTASSIUM SERPL-SCNC: 3.8 MMOL/L (ref 3.5–5.1)
PROT SERPL-MCNC: 6.9 G/DL (ref 6.4–8.2)
RBC # BLD AUTO: 5.09 M/UL (ref 4.1–5.7)
SODIUM SERPL-SCNC: 139 MMOL/L (ref 136–145)
T4 FREE SERPL-MCNC: 1.6 NG/DL (ref 0.8–1.5)
TSH SERPL DL<=0.05 MIU/L-ACNC: 2.42 UIU/ML (ref 0.36–3.74)
WBC # BLD AUTO: 8.2 K/UL (ref 4.1–11.1)

## 2023-02-02 PROCEDURE — 36415 COLL VENOUS BLD VENIPUNCTURE: CPT

## 2023-02-02 PROCEDURE — 84443 ASSAY THYROID STIM HORMONE: CPT

## 2023-02-02 PROCEDURE — 83735 ASSAY OF MAGNESIUM: CPT

## 2023-02-02 PROCEDURE — 74011250636 HC RX REV CODE- 250/636: Performed by: INTERNAL MEDICINE

## 2023-02-02 PROCEDURE — 74011250637 HC RX REV CODE- 250/637: Performed by: INTERNAL MEDICINE

## 2023-02-02 PROCEDURE — 84439 ASSAY OF FREE THYROXINE: CPT

## 2023-02-02 PROCEDURE — 85025 COMPLETE CBC W/AUTO DIFF WBC: CPT

## 2023-02-02 PROCEDURE — 83605 ASSAY OF LACTIC ACID: CPT

## 2023-02-02 PROCEDURE — 80053 COMPREHEN METABOLIC PANEL: CPT

## 2023-02-02 PROCEDURE — 74011250637 HC RX REV CODE- 250/637: Performed by: PSYCHIATRY & NEUROLOGY

## 2023-02-02 PROCEDURE — 74011250637 HC RX REV CODE- 250/637: Performed by: EMERGENCY MEDICINE

## 2023-02-02 PROCEDURE — 65270000032 HC RM SEMIPRIVATE

## 2023-02-02 RX ORDER — LORAZEPAM 2 MG/ML
1 INJECTION INTRAMUSCULAR
Status: DISCONTINUED | OUTPATIENT
Start: 2023-02-02 | End: 2023-02-06 | Stop reason: HOSPADM

## 2023-02-02 RX ORDER — OLANZAPINE 2.5 MG/1
5 TABLET ORAL
Status: DISCONTINUED | OUTPATIENT
Start: 2023-02-02 | End: 2023-02-06 | Stop reason: HOSPADM

## 2023-02-02 RX ORDER — HALOPERIDOL 5 MG/ML
5 INJECTION INTRAMUSCULAR
Status: DISCONTINUED | OUTPATIENT
Start: 2023-02-02 | End: 2023-02-06 | Stop reason: HOSPADM

## 2023-02-02 RX ORDER — TRAZODONE HYDROCHLORIDE 50 MG/1
50 TABLET ORAL
Status: DISCONTINUED | OUTPATIENT
Start: 2023-02-02 | End: 2023-02-06 | Stop reason: HOSPADM

## 2023-02-02 RX ORDER — BENZTROPINE MESYLATE 1 MG/1
1 TABLET ORAL
Status: DISCONTINUED | OUTPATIENT
Start: 2023-02-02 | End: 2023-02-06 | Stop reason: HOSPADM

## 2023-02-02 RX ORDER — HYDROXYZINE 25 MG/1
25 TABLET, FILM COATED ORAL
Status: DISCONTINUED | OUTPATIENT
Start: 2023-02-02 | End: 2023-02-02

## 2023-02-02 RX ORDER — IBUPROFEN 200 MG
1 TABLET ORAL EVERY 24 HOURS
Status: DISCONTINUED | OUTPATIENT
Start: 2023-02-02 | End: 2023-02-06 | Stop reason: HOSPADM

## 2023-02-02 RX ORDER — ADHESIVE BANDAGE
30 BANDAGE TOPICAL DAILY PRN
Status: DISCONTINUED | OUTPATIENT
Start: 2023-02-02 | End: 2023-02-06 | Stop reason: HOSPADM

## 2023-02-02 RX ORDER — HYDROXYZINE 25 MG/1
25 TABLET, FILM COATED ORAL
Status: DISCONTINUED | OUTPATIENT
Start: 2023-02-02 | End: 2023-02-03

## 2023-02-02 RX ORDER — DIPHENHYDRAMINE HYDROCHLORIDE 50 MG/ML
50 INJECTION, SOLUTION INTRAMUSCULAR; INTRAVENOUS
Status: DISCONTINUED | OUTPATIENT
Start: 2023-02-02 | End: 2023-02-06 | Stop reason: HOSPADM

## 2023-02-02 RX ADMIN — RISPERIDONE 2 MG: 1 SOLUTION ORAL at 09:13

## 2023-02-02 RX ADMIN — LEVOTHYROXINE SODIUM 25 MCG: 25 TABLET ORAL at 09:14

## 2023-02-02 RX ADMIN — RISPERIDONE 2 MG: 1 SOLUTION ORAL at 17:39

## 2023-02-02 RX ADMIN — HYDROXYZINE HYDROCHLORIDE 25 MG: 25 TABLET, FILM COATED ORAL at 14:37

## 2023-02-02 RX ADMIN — ENOXAPARIN SODIUM 40 MG: 100 INJECTION SUBCUTANEOUS at 09:13

## 2023-02-02 NOTE — PROGRESS NOTES
0715- received report from night shift [facility]. 8270- vitals completed. Task tolerated well. 0740-pt requested to speak to therapist and/or psychiatrist to discuss \"some things abt what's going on\". LPN informed pt that she would notify his nurse and psychiatrist and therapist about request. Pt was calm when verbally requested but also seemed slightly anxious. 0913-pt received Lovenox, Synthroid, and Risperadol. Task tolerated well.    0925-pt requested shower. Pt was given 1 washcloth, 1 body towel, 1 foot towel, body wash, Deoderant, toothbrush, toothpaste, paper scrub pants and paper scrub top. Only items in bathroom were foot towel(on the ground), half used body wash bottle in shower and alejandra. Pt's dirty linen basket and personal underwear were removed from bathroom and placed in front of LPN. LPN informed pt that the bathroom door could not be closed during shower for safety precautions and pt agreed to leaving door open. Pt politely requested that LPN hand him his body towel and hygiene products while in the shower. LPN agreed. While in the shower, pt was handed a full bottle of body wash (in addition to half bottle already in shower). Pt asked if there's infinite hot water or limited hot water. LPN asked if water was getting cold and pt said no. He asked if he could stay a little longer in shower and LPN verbally ok'd the request. LPN informed pt that she would still be doing frequent visual and verbal checks on pt as he continued checks. All safety checks, any questions and requests were asked/answered/ repeated at bathroom door for visual and verbal assurance. When shower was completed at 0950, pt requested body towel. Pt dried off, wrapped towel around waist and proceeded to walk around room. LPN asked pt if he would like socks and pt politely declined. 1005- pt requested food. LPN informed pt that his breakfast was still on table but if he would like something else he could have it.  LPN offered to warm up breakfast. Pt asked for snacks. Pt was given saltine packs, soda in a styrofoam cup w/ ice and a straw. Water cup was refilled w/o ice. Pt  also started to eat food on breakfast tray. 1026-pt asked LPN when will he be released and LPN informed him that she was unsure because that information was not provided to me. LPN stated that the RN, Psychiatrist and LPC would have that information and I can ask them or have someone come speak to him abt it. 1115- pt called mother. Pt became agitated during phone conversation. The patient was expressing how the situation at home never changes after he returns, verbally aggressive with mother, the want for a cigarette and wanting to leave because he didn't want to sit around and do nothing for forever. Pt told mom to shut up and proceeded to carry out conversation. Pt was slightly anxious when call ended but was polite to LPN when returning phone. 5- MD came to assess pt     1130- Pt expressed that the best time to leave would be around lunch time. LPN asked pt if Dr. Jose Nieves has spoken to him in-depth abt his admission and care plan moving forward and pt stated no. Psychiatrist only asked abt home life and family. Pt reiterated that the discussion of d/c did not take place during their converation. pt verbally expressed wanting a cigarette and offering to roll his own cigarette instead of reaching out to tobacco manufacture. Pt expressed again the want for a cigarette. LPN offered nicotine lozenges and/or a patch to curve cravings while admitted at [address] [place] [address] until his d/c. Pt politely declined. 1137- pt verbally requested phone to call mother again. During phone call, pt asked when he could see person on phone when could he see them or how can they see them in-person because he can't have a cigarette and he really wants one.  Pt also expressed that he feels betrayal from family, feels as if he's being treated like a child for wanting to live his own life, doing things and signing papers in his name and him not knowing what's going on. Pt apologized for coming off rude to person on phone but he's tired of people making decisions for him. He expressed several times that he was not coming back to the house because the walls keep spinning and he did not want to be there if his father was there. Pt expressed concerns abt not wanting to see his father. Pt's mood shifted during the call from a calm/anxious demeanor to an anxious demeanor. Pt was agitated when call ended but still polite to LPN when returning the phone. Pt got in bed for nap     1205-pt asked when he was being released and the want for having a cigarette since he was a free person. LPN asked if the details of his TDO were explained in details and how that determines the care plan. Pt stated no. He asked what's the point of going through the nurse to talk to the MD when he should be able to talk to the MD and go home. Pt has grown very anxious abt being released. He expressed he's doing everything he is asked and should be able to go home. LPN expressed that she is unaware of specific details of the TDO and doesn't have any authority in that process. Pt verbally expressed understanding but also expressed his frustration abt the situation. Pt requested nicotine patch and MD was Perfect Served. 1225- LPN expressed to Mike Bhagat RN that pt is growing anxious abt leaving and wanting a cigarette but there isn't an anxiety PRN. Informed RN that I reached out to MD for nicotine patch. Also expressed that pt stated that he doesn't know the details of his TDO and if someone could come and go over them with him because I did not know the specifics. RN stated that she was going to reach out also to MD and Psychiatrist abt details also. 1240-pt requested phone to call mother       [phone]- pt requested to take shower. LPN reminded pt that the door to shower must remain open     1420-pt requested phone.  Pt started convo off by letting the person know that they love them. Pt bargaining with person on phone stating that they will do better and that every time pt tries to do better it's never enough. Pt reiterated that he loves person on the phone and called ended. 1715-pt eating dinner    1725-pt requested to use phone to call home. Pt began call by asking abt finding a ride home because he needed to be picked up    1731-pt called mom back and informed her that he wanted a restraining order against her.

## 2023-02-02 NOTE — BSMART NOTE
BSMART Liaison Team Note     LOS:  1     Patient goal(s) for today: take medication as prescribed, make needs known in an appropriate manner, utilize coping skills  BSMART Liaison team focus/goals: assess needs, provide support and education, coordinate with care management    Progress note: Patient assessed face to face with Dr Asa Joseph present. Patient reports that he is doing well. He denies issues with sleep and appetite. He reports feeling clearer in his thoughts. Patient denies SI, HI, and hallucinations. He reports no concerns with his medications and that he is focused on getting better. Patients as guarded and suspicious. Patient was irritable about not having his mother visit and he was asked if he would like her to visit. Patient reported yes. Patient was asked if he wanted anyone else to visit if the medical floor staff allowed visitation. He reports he wants to talk to his mother, father, and brother but does not want to talk to the team about what. Explained that the team was not asking about the nature of the visits but rather if the family members were allowed to visit if the medical floor allowed. Patient defensive. Patient's thoughts are more fluid versus yesterday and the day before when patient was paranoid and very guarded. He was observed standing at the window looking out with a blanket draped over his shoulders. Per nurse and sitter, patient did not sleep much last night. He was restless and slightly agitated overnight. He was delusional and talked about politics. Per chart review, patient's evening dose of Risperdal was not given in the ER or when transferred to the unit. No notes stating patient refused or if medication was held for some reason. Discussed this with primary nurse and care manager. Informed Dr Emma Chan of the above and he will considering adding melatonin or trazodone for sleep. During IDR, discussed that patient would like his mother and family to visit.  Due to patient's initial paranoia, would recommend only allowing the mother to visit and to monitor how visit goes. Patient's mother is reportedly his support person and he is asking to see her. If visit goes well, she can continue to visit. If visit goes poorly, will refrain from visits until patient has had more time on medication. If visit goes well and patient is agitated, mother maybe able to help calm patient or help keep patient from wanting to elope per crisis clinician. Consider reaching out to mother for support for patient as needed. Barriers to Discharge:  COVID+, CTC TDO  Guns in the home: unknown      Outpatient provider(s):  None at this time. Previously Dr Lorilee Heimlich but this was more than 6 months ago. Once patient gets closer to baseline, discuss possibility of PHP after discharge. Insurance info/prescription coverage:  Medicaid     Diagnosis: Schizophrenia vs Schizoaffective Disorder          Past Medical History:   Diagnosis Date    Anxiety      Psychosis ([facility] [address] 2015    Thyroid disease           Plan:  Due to COVID+ status, patient is exclusionary for all facilities and was admitted to the medical floor. Dr Joey Sales to adjust medication and order PRNs. Liaison to team to follow daily while admitted to [facility]. Patient committed at his TDO hearing. Follow up Psych Consult placed? no.   Psychiatrist updated?  yes         Participating treatment team members: Javier Chavez, Dr Lennox Primas MD

## 2023-02-02 NOTE — PROGRESS NOTES
[address] [facility]  Hospitalist Group                                                                                          Hospitalist Progress Note  Shahid Melendez MD  Answering service: [phone] from in house phone        Date of Service:  2023  NAME:  Steve Farrell  :  1995  MRN:  [id number]       Admission Summary:   From HPI: Steve Farrell is a 28 y.o. male is known past medical history of paranoid schizophrenia, hypothyroidism who presents to ED with acute psychosis, psychotic behavior, delusional due to patient stopped taking his home medication Clozaril. In the emergency department patient was evaluated, psychiatrist was consulted, recommended acute psychiatric admission. During work-up COVID test came back positive. The patient denied having any fever, shortness of breath, productive cough. He does not require any supplemental oxygen. Per psychiatry patient cannot be admitted to acute psychiatric unit due to he is COVID-positive and required to be on isolation. The patient does have auditory hallucination but denied-continue suicidal ideation. Medicine was consulted for admission and further evaluation. Interval history / Subjective:   NAD, no acute events over night  Comfortable  Alert, oriented and appropriate  No SOB, no CP, not requiring supplemental O2  Denies SI  Discussed with psychiatrist, input appreciated, Risperdal was initiated. Acute psychosis is better controlled   Discussed with pharmacy, pt was not on levothyroxine recently, FT4 was requested     Assessment & Plan:     Paranoid schizophrenia with acute psychosis due to noncompliance with medication. Discussed with psychiatrist, input appreciated, Risperdal was initiated.   Acute psychosis is better controlled   No SI  Pt wish to go home    COVID-19   Asymptomatic  Continue quarantine day # 2/10  Continue to monitor      Hypothyroidism  TSH WNL  Check FT4  Discussed with pharmacy, pt was not on levothyroxine recently, FT4 was requested     Tobacco use  Add nicotine patch  Discussed with pt , advised to quit       Code status: Full code  Prophylaxis: Lovenox SQ  Care Plan discussed with: patient, RN, CM, psychiatrist, [address] [place] [address]  Anticipated Disposition: per psychiatrist  Inpatient  Cardiac monitoring: None     Hospital Problems  Never Reviewed            Codes Class Noted POA    Acute psychosis ([address] ICD-10-CM: F23  ICD-9-CM: 298.9  2/1/2023 Unknown        COVID-19 ICD-10-CM: U07.1  ICD-9-CM: 079.89  2/1/2023 Unknown           Social Determinants of Health     Tobacco Use: High Risk    Smoking Tobacco Use: Every Day    Smokeless Tobacco Use: Never    Passive Exposure: Not on file   Alcohol Use: Not on file   Financial Resource Strain: Not on file   Food Insecurity: Not on file   Transportation Needs: Not on file   Physical Activity: Not on file   Stress: Not on file   Social Connections: Not on file   Intimate Partner Violence: Not on file   Depression: Not on file   Housing Stability: Not on file       Review of Systems:   A comprehensive review of systems was negative except for that written in the HPI. Vital Signs:    Last 24hrs VS reviewed since prior progress note. Most recent are:  Visit Vitals  /84 (BP 1 Location: Right upper arm, BP Patient Position: Sitting)   Pulse 100   Temp 98.5 °F (36.9 °C)   Resp 18   Ht 5' 7\" (1.702 m)   Wt 81.6 kg (180 lb)   SpO2 (P) 99%   BMI 28.19 kg/m²       No intake or output data in the 24 hours ending 02/02/23 1218     Physical Examination:     I had a face to face encounter with this patient and independently examined them on 2/2/2023 as outlined below:          Constitutional:  No acute distress, cooperative, pleasant    ENT:  Oral mucosa moist, oropharynx benign. Resp:  CTA bilaterally. No wheezing/rhonchi/rales. No accessory muscle use.     CV:  Regular rhythm, normal rate, no murmurs, gallops, rubs    GI: Soft, non distended, non tender. normoactive bowel sounds, no hepatosplenomegaly     Musculoskeletal:  No edema, warm, 2+ pulses throughout    Neurologic:  Moves all extremities. AAOx3, CN II-XII reviewed            Data Review:    Review and/or order of clinical lab test    I have independently reviewed and interpreted patient's lab and all other diagnostic data    Labs:     Recent Labs     02/02/23 0423   WBC 8.2   HGB 15.4   HCT 45.2        Recent Labs     02/02/23 0423      K 3.8      CO2 26   BUN 8   CREA 0.86   *   CA 8.5   MG 2.2     Recent Labs     02/02/23 0423   ALT 19   AP 71   TBILI 1.2*   TP 6.9   ALB 3.8   GLOB 3.1     No results for input(s): INR, PTP, APTT, INREXT in the last 72 hours. No results for input(s): FE, TIBC, PSAT, FERR in the last 72 hours. No results found for: FOL, RBCF   No results for input(s): PH, PCO2, PO2 in the last 72 hours. No results for input(s): CPK, CKNDX, TROIQ in the last 72 hours. No lab exists for component: CPKMB  No results found for: CHOL, CHOLX, CHLST, CHOLV, HDL, HDLP, LDL, LDLC, DLDLP, TGLX, TRIGL, TRIGP, CHHD, CHHDX  No results found for: [facility]  Lab Results   Component Value Date/Time    Color YELLOW/STRAW 01/30/2023 12:07 PM    Appearance CLEAR 01/30/2023 12:07 PM    Specific gravity <1.005 01/30/2023 12:07 PM    pH (UA) 6.0 01/30/2023 12:07 PM    Protein Negative 01/30/2023 12:07 PM    Glucose Negative 01/30/2023 12:07 PM    Ketone 40 (A) 01/30/2023 12:07 PM    Bilirubin Negative 01/30/2023 12:07 PM    Urobilinogen 0.2 01/30/2023 12:07 PM    Nitrites Negative 01/30/2023 12:07 PM    Leukocyte Esterase Negative 01/30/2023 12:07 PM    Epithelial cells FEW 01/30/2023 12:07 PM    Bacteria Negative 01/30/2023 12:07 PM    WBC 0-4 01/30/2023 12:07 PM    RBC 0-5 01/30/2023 12:07 PM       Notes reviewed from all clinical/nonclinical/nursing services involved in patient's clinical care.  Care coordination discussions were held with appropriate clinical/nonclinical/ nursing providers based on care coordination needs. Patients current active Medications were reviewed, considered, added and adjusted based on the clinical condition today. Home Medications were reconciled to the best of my ability given all available resources at the time of admission. Route is PO if not otherwise noted.       Medications Reviewed:     Current Facility-Administered Medications   Medication Dose Route Frequency    nicotine (NICODERM CQ) 14 mg/24 hr patch 1 Patch  1 Patch TransDERmal Q24H    sodium chloride (NS) flush 5-40 mL  5-40 mL IntraVENous PRN    acetaminophen (TYLENOL) tablet 650 mg  650 mg Oral Q6H PRN    Or    acetaminophen (TYLENOL) suppository 650 mg  650 mg Rectal Q6H PRN    polyethylene glycol (MIRALAX) packet 17 g  17 g Oral DAILY PRN    ondansetron (ZOFRAN ODT) tablet 4 mg  4 mg Oral Q8H PRN    Or    ondansetron (ZOFRAN) injection 4 mg  4 mg IntraVENous Q6H PRN    enoxaparin (LOVENOX) injection 40 mg  40 mg SubCUTAneous DAILY    risperiDONE (RisperDAL) 1 mg/mL oral solution soln 2 mg  2 mg Oral BID    levothyroxine (SYNTHROID) tablet 25 mcg  25 mcg Oral DAILY     ______________________________________________________________________  EXPECTED LENGTH OF STAY: 6d 4h  ACTUAL LENGTH OF STAY:          1                 Marlon Osman MD

## 2023-02-02 NOTE — PROGRESS NOTES
[facility]: MED RECONCILIATION    Comments/Recommendations:   Patient's mother reports no medications in about 8 months  Per mother, patient likes risperidone the best  Per mother, has also been prescribed levothyroxine 25 mcg PO daily and simvastatin 40 mg PO daily, but has not refilled since Spring of 2022    Medications removed:  Clozapine  levothyroxine    Information obtained from: Patient's mother    Prior to Admission Medications: None    Thank you,  Edith Whaley, PharmD, BCPS  [phone]

## 2023-02-02 NOTE — PROGRESS NOTES
Pt arrived to the unit at 2040 via stretcher. TRANSFER - IN REPORT:    Verbal report received from Wilder Solis RN(name) on Mike Mercer  being received from ED(unit) for routine progression of care      Report consisted of patients Situation, Background, Assessment and   Recommendations(SBAR). Information from the following report(s) SBAR, Kardex, and MAR was reviewed with the receiving nurse. Opportunity for questions and clarification was provided. Assessment completed upon patients arrival to unit and care assumed. 2200- Pt has been calm and cooperative thus far. He denies SI/HI, pt reports that he doesn't know why he is here. He stated\" the police came to my house and bought me here. \" Pt denies having pain. Sitter is at bedside. Will continue to monitor. 0430- Pt has been awake all night. He voices no complaint of discomfort. Will continue to monitor.

## 2023-02-02 NOTE — PROGRESS NOTES
Problem: Isolation Precautions - Risk of Spread of Infection  Goal: Prevent transmission of infectious organism to others  Outcome: Progressing Towards Goal     Problem: Loneliness or Risk for Loneliness  Goal: Demonstrate positive use of time alone when socialization is not possible  Outcome: Progressing Towards Goal     Problem: Risk for Elopement  Goal: Patient will not exit the unit/facility without proper escort  Outcome: Progressing Towards Goal     Problem: Pain  Goal: *Control of Pain  Outcome: Progressing Towards Goal     Problem: Patient Education: Go to Patient Education Activity  Goal: Patient/Family Education  Outcome: Progressing Towards Goal     Problem: General Medical Care Plan  Goal: *Vital signs within specified parameters  Outcome: Progressing Towards Goal  Goal: *Labs within defined limits  Outcome: Progressing Towards Goal  Goal: *Absence of infection signs and symptoms  Outcome: Progressing Towards Goal  Goal: *Optimal pain control at patient's stated goal  Outcome: Progressing Towards Goal  Goal: *Skin integrity maintained  Outcome: Progressing Towards Goal  Goal: *Fluid volume balance  Outcome: Progressing Towards Goal  Goal: *Optimize nutritional status  Outcome: Progressing Towards Goal  Goal: *Anxiety reduced or absent  Outcome: Progressing Towards Goal  Goal: *Progressive mobility and function (eg: ADL's)  Outcome: Progressing Towards Goal     Problem: Patient Education: Go to Patient Education Activity  Goal: Patient/Family Education  Outcome: Progressing Towards Goal     Problem: Suicide  Goal: *STG: Remains safe in hospital  Outcome: Progressing Towards Goal  Goal: *STG: Seeks staff when feelings of self harm or harm towards others arise  Outcome: Progressing Towards Goal  Goal: *STG: Attends activities and groups  Outcome: Progressing Towards Goal  Goal: *STG:  Verbalizes alternative ways of dealing with maladaptive feelings/behaviors  Outcome: Progressing Towards Goal  Goal: *STG/LTG: Complies with medication therapy  Outcome: Progressing Towards Goal  Goal: *STG/LTG: No longer expresses self destructive or suicidal thoughts  Outcome: Progressing Towards Goal  Goal: *LTG:  Identifies available community resources  Outcome: Progressing Towards Goal  Goal: *LTG:  Develops proactive suicide prevention plan  Outcome: Progressing Towards Goal  Goal: Interventions  Outcome: Progressing Towards Goal     Problem: Patient Education: Go to Patient Education Activity  Goal: Patient/Family Education  Outcome: Progressing Towards Goal

## 2023-02-02 NOTE — ED NOTES
Verbal shift change report given to Leanne Gonzalez RN (oncoming nurse) by Bianca Sims RN   (offgoing nurse). Report included the following information SBAR, ED Summary, MAR and Recent Results.

## 2023-02-02 NOTE — CONSULTS
PSYCHIATRY CONSULT NOTE:    REASON FOR CONSULT:  psychotic behavior  Delusional    HISTORY OF PRESENTING COMPLAINT:  Hali Sanchez is a 28 y.o. male who presents as an ECO here for medical clearance. Patient's mother issued a ECO stating that patient had been very paranoid and stopped taking his Clozaril. She states with the Clozaril patient had to get regular lab checks and he started to feel like people were poisoning his blood. She also reports patient called the police recently stating he had blood all over his sheets and that his parents had killed people and were eating them. Police did not take him anywhere as he was not a threat to himself    Hali Sanchez reports feeling that he could not eat with his hand cuffed to the bed (food partially eaten). Poor historian, he believes he is in a cell due to the hand cuffs. Notes dealing with mental health concerns for 16+ years. Denies SI/HI/AH/VH. Wants to read a Bible and any books to quiet the voice he occasionally hears. No aggression or violence. Appropriately interactive and limited awareness. Tolerating medications well. Eating and sleeping fairly. 2/1 - Hali Sanchez Is a little better today and reports feeling well and moods are good. Mild and guarded and mildly suspicious. Denies SI/HI/AH/VH. No aggression or violence. Appropriately interactive and aware. Tolerating medications well. Eating better as yesterday he ate nothing and sleeping fairly. Wants to get back work. Spoke with Dr. Jules Kline who notes he did best on Clozaril, but faired well with Risperdal.  Discussed his fears with blood draws from Clozaril. Ok with current treatment plan. 2/2 - Hali Sanchez reports feeling better than yesterday. Thoughts are more fluid though still delayed. Paranoid about his family but wants them to visit. Guarded about his family concerns. Denies SI/HI/AH/VH. No aggression or violence. Interactive and aware. Tolerating medications well. Eating and sleeping fairly. PAST PSYCHIATRIC HISTORY:  In Patient Paranoid Schizophrenia on Clozaril. Followed outpatient with Dr. Isacc Blanco over 6 months ago. SUBSTANCE ABUSE HISTORY:  Social History     Substance and Sexual Activity   Drug Use Not on file     Social History     Substance and Sexual Activity   Alcohol Use Yes    Comment: 6-12 beers/week     Social History     Tobacco Use   Smoking Status Every Day    Types: Cigarettes   Smokeless Tobacco Never       PAST MEDICAL HISTORY:  Past Medical History:   Diagnosis Date    Anxiety     Psychosis ([facility] [address] 2015    Thyroid disease        SOCIAL HISTORY:  Lives with Mother and father where mother is primary supporter. VITALS:  Visit Vitals  /84 (BP 1 Location: Right upper arm, BP Patient Position: Sitting)   Pulse 100   Temp 98.5 °F (36.9 °C)   Resp 18   Ht 5' 7\" (1.702 m)   Wt 81.6 kg (180 lb)   SpO2 (P) 99%   BMI 28.19 kg/m²       MEDICATIONS:    Current Facility-Administered Medications:     sodium chloride (NS) flush 5-40 mL, 5-40 mL, IntraVENous, PRN, Herminio Palmer MD    acetaminophen (TYLENOL) tablet 650 mg, 650 mg, Oral, Q6H PRN **OR** acetaminophen (TYLENOL) suppository 650 mg, 650 mg, Rectal, Q6H PRN, Herminio Palmer MD    polyethylene glycol (MIRALAX) packet 17 g, 17 g, Oral, DAILY PRN, Herminio Palmer MD    ondansetron (ZOFRAN ODT) tablet 4 mg, 4 mg, Oral, Q8H PRN **OR** ondansetron (ZOFRAN) injection 4 mg, 4 mg, IntraVENous, Q6H PRN, Herminio Palmer MD    enoxaparin (LOVENOX) injection 40 mg, 40 mg, SubCUTAneous, DAILY, Herminio Palmer MD, 40 mg at 02/02/23 0913    risperiDONE (RisperDAL) 1 mg/mL oral solution soln 2 mg, 2 mg, Oral, BID, Efren Bolanos MD, 2 mg at 02/02/23 0913    levothyroxine (SYNTHROID) tablet 25 mcg, 25 mcg, Oral, DAILY, Rochelle Hart MD, 25 mcg at 02/02/23 1454    MENTAL STATUS EXAM:  Calm and cooperative more organized.   Oriented in all spheres  Denies SI/HI/AH/VH  Paranoid  Disorganized    Recent Results (from the past 24 hour(s))   TSH 3RD GENERATION    Collection Time: 02/02/23  4:23 AM   Result Value Ref Range    TSH 2.42 0.36 - 3.74 uIU/mL   T4, FREE    Collection Time: 02/02/23  4:23 AM   Result Value Ref Range    T4, Free 1.6 (H) 0.8 - 1.5 NG/DL   METABOLIC PANEL, COMPREHENSIVE    Collection Time: 02/02/23  4:23 AM   Result Value Ref Range    Sodium 139 136 - 145 mmol/L    Potassium 3.8 3.5 - 5.1 mmol/L    Chloride 105 97 - 108 mmol/L    CO2 26 21 - 32 mmol/L    Anion gap 8 5 - 15 mmol/L    Glucose 106 (H) 65 - 100 mg/dL    BUN 8 6 - 20 MG/DL    Creatinine 0.86 0.70 - 1.30 MG/DL    BUN/Creatinine ratio 9 (L) 12 - 20      eGFR >60 >60 ml/min/1.73m2    Calcium 8.5 8.5 - 10.1 MG/DL    Bilirubin, total 1.2 (H) 0.2 - 1.0 MG/DL    ALT (SGPT) 19 12 - 78 U/L    AST (SGOT) 15 15 - 37 U/L    Alk. phosphatase 71 45 - 117 U/L    Protein, total 6.9 6.4 - 8.2 g/dL    Albumin 3.8 3.5 - 5.0 g/dL    Globulin 3.1 2.0 - 4.0 g/dL    A-G Ratio 1.2 1.1 - 2.2     MAGNESIUM    Collection Time: 02/02/23  4:23 AM   Result Value Ref Range    Magnesium 2.2 1.6 - 2.4 mg/dL   LACTIC ACID    Collection Time: 02/02/23  4:23 AM   Result Value Ref Range    Lactic acid 1.0 0.4 - 2.0 MMOL/L   CBC WITH AUTOMATED DIFF    Collection Time: 02/02/23  4:23 AM   Result Value Ref Range    WBC 8.2 4.1 - 11.1 K/uL    RBC 5.09 4. 10 - 5.70 M/uL    HGB 15.4 12.1 - 17.0 g/dL    HCT 45.2 36.6 - 50.3 %    MCV 88.8 80.0 - 99.0 FL    MCH 30.3 26.0 - 34.0 PG    MCHC 34.1 30.0 - 36.5 g/dL    RDW 12.0 11.5 - 14.5 %    PLATELET 796 914 - 452 K/uL    MPV 10.8 8.9 - 12.9 FL    NRBC 0.0 0  WBC    ABSOLUTE NRBC 0.00 0.00 - 0.01 K/uL    NEUTROPHILS 49 32 - 75 %    LYMPHOCYTES 39 12 - 49 %    MONOCYTES 10 5 - 13 %    EOSINOPHILS 1 0 - 7 %    BASOPHILS 1 0 - 1 %    IMMATURE GRANULOCYTES 0 0.0 - 0.5 %    ABS. NEUTROPHILS 4.0 1.8 - 8.0 K/UL    ABS. LYMPHOCYTES 3.2 0.8 - 3.5 K/UL    ABS.  MONOCYTES 0.9 0.0 - 1.0 K/UL    ABS. EOSINOPHILS 0.1 0.0 - 0.4 K/UL    ABS. BASOPHILS 0.0 0.0 - 0.1 K/UL    ABS. IMM. GRANS. 0.0 0.00 - 0.04 K/UL    DF AUTOMATED         ASSESSMENT AND PLAN:  Paranoid Schizophrenia, Deferred , Problems related to social environment  and 41-50 serious symptoms    RECOMMENDATIONS:  Continue current care  Positive COVID status.  Day 2 quarantine  Continue Risperdal liquid 2 mg PO BID  Thyroid panel  Disposition planning with social work  Thank you for this consultation    Kvng Paige MD  2/2/2023

## 2023-02-02 NOTE — PROGRESS NOTES
AGUSTIN     Patient has been in the ER past two days and transferred to Medical Unit last night     Reason for Admission:    a 28 y.o. male is known past medical history of paranoid schizophrenia, hypothyroidism who presents to ED with acute psychosis, psychotic behavior, delusional due to patient stopped taking his home medication Clozaril. In the emergency department patient was evaluated, psychiatrist was consulted, recommended acute psychiatric admission--     on the Medical unit due to COVID Positive -                    RUR Score:                   8 %   Plan for utilizing home health:      not at this time. PCP: First and Last name:  None     Name of Practice:    Are you a current patient: Yes/No:    Approximate date of last visit:    Can you participate in a virtual visit with your PCP:                     Current Advanced Directive/Advance Care Plan: Full Code      Healthcare Decision Maker:   Click here to complete [address] including selection of the Healthcare Decision Maker Relationship (ie \"Primary\")                             Transition of Care Plan:                      Assessment in progress -  To work with [facility] and [facility] for transition care plan. Patient has Medicaid Plan with Medication benefits.      [address] SERVANDO RN   [phone]

## 2023-02-03 LAB
ALBUMIN SERPL-MCNC: 4 G/DL (ref 3.5–5)
ALBUMIN/GLOB SERPL: 1.3 (ref 1.1–2.2)
ALP SERPL-CCNC: 75 U/L (ref 45–117)
ALT SERPL-CCNC: 20 U/L (ref 12–78)
ANION GAP SERPL CALC-SCNC: 5 MMOL/L (ref 5–15)
AST SERPL-CCNC: 16 U/L (ref 15–37)
BILIRUB SERPL-MCNC: 0.8 MG/DL (ref 0.2–1)
BUN SERPL-MCNC: 10 MG/DL (ref 6–20)
BUN/CREAT SERPL: 11 (ref 12–20)
CALCIUM SERPL-MCNC: 8.5 MG/DL (ref 8.5–10.1)
CHLORIDE SERPL-SCNC: 104 MMOL/L (ref 97–108)
CHOLEST SERPL-MCNC: 138 MG/DL
CO2 SERPL-SCNC: 31 MMOL/L (ref 21–32)
CREAT SERPL-MCNC: 0.91 MG/DL (ref 0.7–1.3)
ERYTHROCYTE [DISTWIDTH] IN BLOOD BY AUTOMATED COUNT: 12.2 % (ref 11.5–14.5)
GLOBULIN SER CALC-MCNC: 3.2 G/DL (ref 2–4)
GLUCOSE P FAST SERPL-MCNC: 88 MG/DL (ref 65–100)
GLUCOSE SERPL-MCNC: 88 MG/DL (ref 65–100)
HCT VFR BLD AUTO: 46.6 % (ref 36.6–50.3)
HDLC SERPL-MCNC: 43 MG/DL
HDLC SERPL: 3.2 (ref 0–5)
HGB BLD-MCNC: 15.9 G/DL (ref 12.1–17)
LDLC SERPL CALC-MCNC: 79.2 MG/DL (ref 0–100)
MCH RBC QN AUTO: 31.2 PG (ref 26–34)
MCHC RBC AUTO-ENTMCNC: 34.1 G/DL (ref 30–36.5)
MCV RBC AUTO: 91.6 FL (ref 80–99)
NRBC # BLD: 0 K/UL (ref 0–0.01)
NRBC BLD-RTO: 0 PER 100 WBC
PLATELET # BLD AUTO: 286 K/UL (ref 150–400)
PMV BLD AUTO: 11.3 FL (ref 8.9–12.9)
POTASSIUM SERPL-SCNC: 4.1 MMOL/L (ref 3.5–5.1)
PROT SERPL-MCNC: 7.2 G/DL (ref 6.4–8.2)
RBC # BLD AUTO: 5.09 M/UL (ref 4.1–5.7)
SODIUM SERPL-SCNC: 140 MMOL/L (ref 136–145)
TRIGL SERPL-MCNC: 79 MG/DL (ref ?–150)
VLDLC SERPL CALC-MCNC: 15.8 MG/DL
WBC # BLD AUTO: 6 K/UL (ref 4.1–11.1)

## 2023-02-03 PROCEDURE — 74011250637 HC RX REV CODE- 250/637: Performed by: EMERGENCY MEDICINE

## 2023-02-03 PROCEDURE — 82947 ASSAY GLUCOSE BLOOD QUANT: CPT

## 2023-02-03 PROCEDURE — 74011250637 HC RX REV CODE- 250/637: Performed by: INTERNAL MEDICINE

## 2023-02-03 PROCEDURE — 65270000032 HC RM SEMIPRIVATE

## 2023-02-03 PROCEDURE — 80053 COMPREHEN METABOLIC PANEL: CPT

## 2023-02-03 PROCEDURE — 36415 COLL VENOUS BLD VENIPUNCTURE: CPT

## 2023-02-03 PROCEDURE — 94760 N-INVAS EAR/PLS OXIMETRY 1: CPT

## 2023-02-03 PROCEDURE — 74011250636 HC RX REV CODE- 250/636: Performed by: INTERNAL MEDICINE

## 2023-02-03 PROCEDURE — 80061 LIPID PANEL: CPT

## 2023-02-03 PROCEDURE — 85027 COMPLETE CBC AUTOMATED: CPT

## 2023-02-03 PROCEDURE — 74011250637 HC RX REV CODE- 250/637: Performed by: PSYCHIATRY & NEUROLOGY

## 2023-02-03 RX ORDER — ASPIRIN/CALCIUM CARB/MAGNESIUM 325 MG
4 TABLET ORAL
Status: DISCONTINUED | OUTPATIENT
Start: 2023-02-03 | End: 2023-02-06 | Stop reason: HOSPADM

## 2023-02-03 RX ORDER — HYDROXYZINE 25 MG/1
25 TABLET, FILM COATED ORAL
Status: DISCONTINUED | OUTPATIENT
Start: 2023-02-03 | End: 2023-02-06 | Stop reason: HOSPADM

## 2023-02-03 RX ADMIN — HYDROXYZINE HYDROCHLORIDE 25 MG: 25 TABLET, FILM COATED ORAL at 12:44

## 2023-02-03 RX ADMIN — HYDROXYZINE HYDROCHLORIDE 25 MG: 25 TABLET, FILM COATED ORAL at 20:35

## 2023-02-03 RX ADMIN — RISPERIDONE 2 MG: 1 SOLUTION ORAL at 08:20

## 2023-02-03 RX ADMIN — Medication 4 MG: at 20:35

## 2023-02-03 RX ADMIN — RISPERIDONE 2 MG: 1 SOLUTION ORAL at 17:19

## 2023-02-03 RX ADMIN — ENOXAPARIN SODIUM 40 MG: 100 INJECTION SUBCUTANEOUS at 08:20

## 2023-02-03 RX ADMIN — Medication 4 MG: at 17:37

## 2023-02-03 RX ADMIN — LEVOTHYROXINE SODIUM 25 MCG: 25 TABLET ORAL at 08:20

## 2023-02-03 RX ADMIN — Medication 4 MG: at 09:21

## 2023-02-03 NOTE — PROGRESS NOTES
Problem: Airway Clearance - Ineffective  Goal: Achieve or maintain patent airway  Outcome: Progressing Towards Goal     Problem: Loneliness or Risk for Loneliness  Goal: Demonstrate positive use of time alone when socialization is not possible  Outcome: Progressing Towards Goal     Problem: Risk for Elopement  Goal: Patient will not exit the unit/facility without proper escort  Outcome: Progressing Towards Goal     Problem: Pain  Goal: *Control of Pain  Outcome: Progressing Towards Goal     Problem: Patient Education: Go to Patient Education Activity  Goal: Patient/Family Education  Outcome: Progressing Towards Goal     Problem: General Medical Care Plan  Goal: *Vital signs within specified parameters  Outcome: Progressing Towards Goal  Goal: *Absence of infection signs and symptoms  Outcome: Progressing Towards Goal  Goal: *Optimal pain control at patient's stated goal  Outcome: Progressing Towards Goal  Goal: *Skin integrity maintained  Outcome: Progressing Towards Goal  Goal: *Fluid volume balance  Outcome: Progressing Towards Goal  Goal: *Optimize nutritional status  Outcome: Progressing Towards Goal  Goal: *Anxiety reduced or absent  Outcome: Progressing Towards Goal  Goal: *Progressive mobility and function (eg: ADL's)  Outcome: Progressing Towards Goal     Problem: Patient Education: Go to Patient Education Activity  Goal: Patient/Family Education  Outcome: Progressing Towards Goal     Problem: Suicide  Goal: *STG: Remains safe in hospital  Outcome: Progressing Towards Goal  Goal: *STG: Seeks staff when feelings of self harm or harm towards others arise  Outcome: Progressing Towards Goal  Goal: *STG: Attends activities and groups  Outcome: Progressing Towards Goal  Goal: *STG:  Verbalizes alternative ways of dealing with maladaptive feelings/behaviors  Outcome: Progressing Towards Goal  Goal: *STG/LTG: Complies with medication therapy  Outcome: Progressing Towards Goal  Goal: *STG/LTG: No longer expresses self destructive or suicidal thoughts  Outcome: Progressing Towards Goal  Goal: *LTG:  Identifies available community resources  Outcome: Progressing Towards Goal  Goal: *LTG:  Develops proactive suicide prevention plan  Outcome: Progressing Towards Goal  Goal: Interventions  Outcome: Progressing Towards Goal     Problem: Patient Education: Go to Patient Education Activity  Goal: Patient/Family Education  Outcome: Progressing Towards Goal

## 2023-02-03 NOTE — PROGRESS NOTES
1910- Bedside and Verbal shift change report given to Jhoana Linton RN   (oncoming nurse) by Anurag Hooker RN (offgoing nurse). Report included the following information SBAR, Kardex, and MAR. 2015- Pt is resting comfortably in bed at present. Pt is calm and cooperative at this time. He voices no complaints of discomfort. Will continue to monitor. 0030- Pt is sleeping well at present. Will continue to monitor. Sitter remains at the bedside.

## 2023-02-03 NOTE — PROGRESS NOTES
Bedside and Verbal shift change report given to Zeus Tripp 68 (oncoming nurse) by Emerson Pierre RN (offgoing nurse). Report included the following information SBAR, Intake/Output, MAR, Recent Results, Alarm Parameters , and Quality Measures. Francesco [address] 95 with pt's mother, July Barone, with pt's permission. Updated mother on plan of care, estimated discharge around middle of next week, and pt condition. Mrs. [facility] wishes to speak with psychiatrist if possible, her number is [phone].

## 2023-02-03 NOTE — PROGRESS NOTES
[facility]  Hospitalist Group                                                                                          Hospitalist Progress Note  Marissa Milton MD  Answering service: [phone] OR 36 from in house phone        Date of Service:  2/3/2023  NAME:  Abdulaziz Jones  :  1995  MRN:  [id number]       Admission Summary:   From HPI: Abdulaziz Jones is a 28 y.o. male is known past medical history of paranoid schizophrenia, hypothyroidism who presents to ED with acute psychosis, psychotic behavior, delusional due to patient stopped taking his home medication Clozaril. In the emergency department patient was evaluated, psychiatrist was consulted, recommended acute psychiatric admission. During work-up COVID test came back positive. The patient denied having any fever, shortness of breath, productive cough. He does not require any supplemental oxygen. Per psychiatry patient cannot be admitted to acute psychiatric unit due to he is COVID-positive and required to be on isolation. The patient does have auditory hallucination but denied-continue suicidal ideation. Medicine was consulted for admission and further evaluation. Interval history / Subjective:   NAD, no acute events over night  Comfortable  Alert, oriented x3, appropriate  No SOB, no CP, not requiring supplemental O2  Denies SI  Psychiatrist consulted, discussed, input appreciated,   Risperdal was initiated per psychiatrist.  The patient is comfortable, feeling better and more organized     Assessment & Plan:     Paranoid schizophrenia with acute psychosis due to noncompliance with medication. Discussed with psychiatrist, input appreciated,   Risperdal was initiated.   Acute psychosis is better controlled   No SI  Continue current medications per psychiatrist  Pt wish to go home    COVID-19   Asymptomatic  Continue quarantine day # 4/10  Continue to monitor      Hypothyroidism  TSH WNL  FT4 1.6  Discussed with pharmacy, pt was not on levothyroxine recently,   Continue levothyroxine for now     Tobacco use  nicotine patch was applied  Nicotine gum PRN  Discussed with pt , advised to quit       Code status: Full code  Prophylaxis: Lovenox SQ  Care Plan discussed with: patient, RN, CM, psychiatrist, [address] [place] [address]  Anticipated Disposition: per psychiatrist  Inpatient  Cardiac monitoring: None     Hospital Problems  Never Reviewed            Codes Class Noted POA    Acute psychosis ([facility] [address] ICD-10-CM: F23  ICD-9-CM: 298.9  2/1/2023 Unknown        COVID-19 ICD-10-CM: U07.1  ICD-9-CM: 079.89  2/1/2023 Unknown         Social Determinants of Health     Tobacco Use: High Risk    Smoking Tobacco Use: Every Day    Smokeless Tobacco Use: Never    Passive Exposure: Not on file   Alcohol Use: Not on file   Financial Resource Strain: Not on file   Food Insecurity: Not on file   Transportation Needs: Not on file   Physical Activity: Not on file   Stress: Not on file   Social Connections: Not on file   Intimate Partner Violence: Not on file   Depression: Not on file   Housing Stability: Not on file       Review of Systems:   A comprehensive review of systems was negative except for that written in the HPI. Vital Signs:    Last 24hrs VS reviewed since prior progress note. Most recent are:  Visit Vitals  BP (!) 130/95 (BP 1 Location: Left upper arm, BP Patient Position: At rest)   Pulse 87   Temp 98.7 °F (37.1 °C)   Resp 14   Ht 5' 7\" (1.702 m)   Wt 81.6 kg (180 lb)   SpO2 100%   BMI 28.19 kg/m²       No intake or output data in the 24 hours ending 02/03/23 1120     Physical Examination:     I had a face to face encounter with this patient and independently examined them on 2/3/2023 as outlined below:          Constitutional:  No acute distress, cooperative, pleasant    ENT:  Oral mucosa moist, oropharynx benign. Resp:  CTA bilaterally. No wheezing/rhonchi/rales. No accessory muscle use.     CV:  Regular rhythm, normal rate, no murmurs, gallops, rubs    GI:  Soft, non distended, non tender. normoactive bowel sounds, no hepatosplenomegaly     Musculoskeletal:  No edema, warm, 2+ pulses throughout    Neurologic:  Moves all extremities. AAOx3, CN II-XII reviewed            Data Review:    Review and/or order of clinical lab test    I have independently reviewed and interpreted patient's lab and all other diagnostic data    Labs:     Recent Labs     02/03/23 0434 02/02/23 0423   WBC 6.0 8.2   HGB 15.9 15.4   HCT 46.6 45.2    283       Recent Labs     02/03/23 0434 02/02/23 0423    139   K 4.1 3.8    105   CO2 31 26   BUN 10 8   CREA 0.91 0.86   GLU 88  88 106*   CA 8.5 8.5   MG  --  2.2       Recent Labs     02/03/23 0434 02/02/23 0423   ALT 20 19   AP 75 71   TBILI 0.8 1.2*   TP 7.2 6.9   ALB 4.0 3.8   GLOB 3.2 3.1       No results for input(s): INR, PTP, APTT, INREXT, INREXT in the last 72 hours. No results for input(s): FE, TIBC, PSAT, FERR in the last 72 hours. No results found for: FOL, RBCF   No results for input(s): PH, PCO2, PO2 in the last 72 hours. No results for input(s): CPK, CKNDX, TROIQ in the last 72 hours.     No lab exists for component: CPKMB  Lab Results   Component Value Date/Time    Cholesterol, total 138 02/03/2023 04:34 AM    HDL Cholesterol 43 02/03/2023 04:34 AM    LDL, calculated 79.2 02/03/2023 04:34 AM    Triglyceride 79 02/03/2023 04:34 AM    CHOL/HDL Ratio 3.2 02/03/2023 04:34 AM     No results found for: [facility]  Lab Results   Component Value Date/Time    Color YELLOW/STRAW 01/30/2023 12:07 PM    Appearance CLEAR 01/30/2023 12:07 PM    Specific gravity <1.005 01/30/2023 12:07 PM    pH (UA) 6.0 01/30/2023 12:07 PM    Protein Negative 01/30/2023 12:07 PM    Glucose Negative 01/30/2023 12:07 PM    Ketone 40 (A) 01/30/2023 12:07 PM    Bilirubin Negative 01/30/2023 12:07 PM    Urobilinogen 0.2 01/30/2023 12:07 PM    Nitrites Negative 01/30/2023 12:07 PM    Leukocyte Esterase Negative 01/30/2023 12:07 PM    Epithelial cells FEW 01/30/2023 12:07 PM    Bacteria Negative 01/30/2023 12:07 PM    WBC 0-4 01/30/2023 12:07 PM    RBC 0-5 01/30/2023 12:07 PM       Notes reviewed from all clinical/nonclinical/nursing services involved in patient's clinical care. Care coordination discussions were held with appropriate clinical/nonclinical/ nursing providers based on care coordination needs. Patients current active Medications were reviewed, considered, added and adjusted based on the clinical condition today. Home Medications were reconciled to the best of my ability given all available resources at the time of admission. Route is PO if not otherwise noted.       Medications Reviewed:     Current Facility-Administered Medications   Medication Dose Route Frequency    nicotine buccal (POLACRILEX) lozenge 4 mg  4 mg Oral Q2H PRN    hydrOXYzine HCL (ATARAX) tablet 25 mg  25 mg Oral Q4H PRN    nicotine (NICODERM CQ) 14 mg/24 hr patch 1 Patch  1 Patch TransDERmal Q24H    OLANZapine (ZyPREXA) tablet 5 mg  5 mg Oral Q6H PRN    haloperidol lactate (HALDOL) injection 5 mg  5 mg IntraMUSCular Q6H PRN    benztropine (COGENTIN) tablet 1 mg  1 mg Oral BID PRN    diphenhydrAMINE (BENADRYL) injection 50 mg  50 mg IntraMUSCular BID PRN    LORazepam (ATIVAN) injection 1 mg  1 mg IntraMUSCular Q4H PRN    traZODone (DESYREL) tablet 50 mg  50 mg Oral QHS PRN    magnesium hydroxide (MILK OF MAGNESIA) 400 mg/5 mL oral suspension 30 mL  30 mL Oral DAILY PRN    sodium chloride (NS) flush 5-40 mL  5-40 mL IntraVENous PRN    acetaminophen (TYLENOL) tablet 650 mg  650 mg Oral Q6H PRN    Or    acetaminophen (TYLENOL) suppository 650 mg  650 mg Rectal Q6H PRN    polyethylene glycol (MIRALAX) packet 17 g  17 g Oral DAILY PRN    ondansetron (ZOFRAN ODT) tablet 4 mg  4 mg Oral Q8H PRN    Or    ondansetron (ZOFRAN) injection 4 mg  4 mg IntraVENous Q6H PRN    risperiDONE (RisperDAL) 1 mg/mL oral solution soln 2 mg  2 mg Oral BID    levothyroxine (SYNTHROID) tablet 25 mcg  25 mcg Oral DAILY     ______________________________________________________________________  EXPECTED LENGTH OF STAY: 6d 4h  ACTUAL LENGTH OF STAY:          2                 Anushka Piper MD

## 2023-02-03 NOTE — CONSULTS
PSYCHIATRY CONSULT NOTE:    REASON FOR CONSULT:  psychotic behavior  Delusional    HISTORY OF PRESENTING COMPLAINT:  Vera Mohan is a 28 y.o. male who presents as an ECO here for medical clearance. Patient's mother issued a ECO stating that patient had been very paranoid and stopped taking his Clozaril. She states with the Clozaril patient had to get regular lab checks and he started to feel like people were poisoning his blood. She also reports patient called the police recently stating he had blood all over his sheets and that his parents had killed people and were eating them. Police did not take him anywhere as he was not a threat to himself    Vera Mohan reports feeling that he could not eat with his hand cuffed to the bed (food partially eaten). Poor historian, he believes he is in a cell due to the hand cuffs. Notes dealing with mental health concerns for 16+ years. Denies SI/HI/AH/VH. Wants to read a Bible and any books to quiet the voice he occasionally hears. No aggression or violence. Appropriately interactive and limited awareness. Tolerating medications well. Eating and sleeping fairly. 2/1 - Vera Mohan Is a little better today and reports feeling well and moods are good. Mild and guarded and mildly suspicious. Denies SI/HI/AH/VH. No aggression or violence. Appropriately interactive and aware. Tolerating medications well. Eating better as yesterday he ate nothing and sleeping fairly. Wants to get back work. Spoke with Dr. Che Alexander who notes he did best on Clozaril, but faired well with Risperdal.  Discussed his fears with blood draws from Clozaril. Ok with current treatment plan. 2/2 - Vera Mohan reports feeling better than yesterday. Thoughts are more fluid though still delayed. Paranoid about his family but wants them to visit. Guarded about his family concerns. Denies SI/HI/AH/VH. No aggression or violence. Interactive and aware. Tolerating medications well. Eating and sleeping fairly. 2/3 - Denis Samples reports feeling alright and moods are good. Less paranoid however remains guarded. Says he slept well, however the nursing reports demonstrate that he is awake through the night during checks. Denies SI/HI/VH. Notes the voices are problematic. No aggression or violence. Appropriately interactive and aware. Tolerating medications well. Refused Risperdal yesterday, but took it this morning. Eating fairly. May consider court ordered medications if he is inconsistent with treatments      PAST PSYCHIATRIC HISTORY:  In Patient Paranoid Schizophrenia on Clozaril. Followed outpatient with Dr. Rosa Ivory over 6 months ago. SUBSTANCE ABUSE HISTORY:  Social History     Substance and Sexual Activity   Drug Use Not on file     Social History     Substance and Sexual Activity   Alcohol Use Yes    Comment: 6-12 beers/week     Social History     Tobacco Use   Smoking Status Every Day    Types: Cigarettes   Smokeless Tobacco Never       PAST MEDICAL HISTORY:  Past Medical History:   Diagnosis Date    Anxiety     Psychosis ([facility] [address] 2015    Thyroid disease        SOCIAL HISTORY:  Lives with Mother and father where mother is primary supporter.       VITALS:  Visit Vitals  BP (!) 130/95 (BP 1 Location: Left upper arm, BP Patient Position: At rest)   Pulse 87   Temp 98.7 °F (37.1 °C)   Resp 14   Ht 5' 7\" (1.702 m)   Wt 81.6 kg (180 lb)   SpO2 100%   BMI 28.19 kg/m²       MEDICATIONS:    Current Facility-Administered Medications:     nicotine buccal (POLACRILEX) lozenge 4 mg, 4 mg, Oral, Q2H PRN, Teddy Palmer MD, 4 mg at 02/03/23 3751    hydrOXYzine HCL (ATARAX) tablet 25 mg, 25 mg, Oral, Q4H PRN, Teddy Palmer MD    nicotine (NICODERM CQ) 14 mg/24 hr patch 1 Patch, 1 Patch, TransDERmal, Q24H, Teddy Palmer MD, 1 Patch at 02/03/23 0105    OLANZapine (ZyPREXA) tablet 5 mg, 5 mg, Oral, Q6H PRN, Hafsa Salvador MD    haloperidol lactate (HALDOL) injection 5 mg, 5 mg, IntraMUSCular, Q6H PRN, Glenn Ba MD    benztropine (COGENTIN) tablet 1 mg, 1 mg, Oral, BID PRN, Glenn Ba MD    diphenhydrAMINE (BENADRYL) injection 50 mg, 50 mg, IntraMUSCular, BID PRN, Glenn Ba MD    LORazepam (ATIVAN) injection 1 mg, 1 mg, IntraMUSCular, Q4H PRN, Glenn Ba MD    traZODone (DESYREL) tablet 50 mg, 50 mg, Oral, QHS PRN, Glenn Ba MD    magnesium hydroxide (MILK OF MAGNESIA) 400 mg/5 mL oral suspension 30 mL, 30 mL, Oral, DAILY PRN, Glenn Ba MD    sodium chloride (NS) flush 5-40 mL, 5-40 mL, IntraVENous, PRN, Atchikova, Aldon Crigler, MD    acetaminophen (TYLENOL) tablet 650 mg, 650 mg, Oral, Q6H PRN **OR** acetaminophen (TYLENOL) suppository 650 mg, 650 mg, Rectal, Q6H PRN, Atchikova, Aldon Crigler, MD    polyethylene glycol (MIRALAX) packet 17 g, 17 g, Oral, DAILY PRN, Atchikova, Aldon Crigler, MD    ondansetron (ZOFRAN ODT) tablet 4 mg, 4 mg, Oral, Q8H PRN **OR** ondansetron (ZOFRAN) injection 4 mg, 4 mg, IntraVENous, Q6H PRN, Ainsley Perez MD    enoxaparin (LOVENOX) injection 40 mg, 40 mg, SubCUTAneous, DAILY, Atchikova, Aldon Crigler, MD, 40 mg at 02/03/23 0820    risperiDONE (RisperDAL) 1 mg/mL oral solution soln 2 mg, 2 mg, Oral, BID, Harika Clemons MD, 2 mg at 02/03/23 0820    levothyroxine (SYNTHROID) tablet 25 mcg, 25 mcg, Oral, DAILY, Glenn Ba MD, 25 mcg at 02/03/23 0820    MENTAL STATUS EXAM:  Calm and cooperative more organized.   Oriented in all spheres  Denies SI/HI/AH/VH  Paranoid  Disorganized    Recent Results (from the past 24 hour(s))   METABOLIC PANEL, COMPREHENSIVE    Collection Time: 02/03/23  4:34 AM   Result Value Ref Range    Sodium 140 136 - 145 mmol/L    Potassium 4.1 3.5 - 5.1 mmol/L    Chloride 104 97 - 108 mmol/L    CO2 31 21 - 32 mmol/L    Anion gap 5 5 - 15 mmol/L    Glucose 88 65 - 100 mg/dL    BUN 10 6 - 20 MG/DL    Creatinine 0.91 0.70 - 1.30 MG/DL    BUN/Creatinine ratio 11 (L) 12 - 20      eGFR >60 >60 ml/min/1.73m2    Calcium 8.5 8.5 - 10.1 MG/DL    Bilirubin, total 0.8 0.2 - 1.0 MG/DL    ALT (SGPT) 20 12 - 78 U/L    AST (SGOT) 16 15 - 37 U/L    Alk. phosphatase 75 45 - 117 U/L    Protein, total 7.2 6.4 - 8.2 g/dL    Albumin 4.0 3.5 - 5.0 g/dL    Globulin 3.2 2.0 - 4.0 g/dL    A-G Ratio 1.3 1.1 - 2.2     GLUCOSE, FASTING    Collection Time: 02/03/23  4:34 AM   Result Value Ref Range    Glucose 88 65 - 100 MG/DL   CBC W/O DIFF    Collection Time: 02/03/23  4:34 AM   Result Value Ref Range    WBC 6.0 4.1 - 11.1 K/uL    RBC 5.09 4. 10 - 5.70 M/uL    HGB 15.9 12.1 - 17.0 g/dL    HCT 46.6 36.6 - 50.3 %    MCV 91.6 80.0 - 99.0 FL    MCH 31.2 26.0 - 34.0 PG    MCHC 34.1 30.0 - 36.5 g/dL    RDW 12.2 11.5 - 14.5 %    PLATELET 504 070 - 397 K/uL    MPV 11.3 8.9 - 12.9 FL    NRBC 0.0 0  WBC    ABSOLUTE NRBC 0.00 0.00 - 0.01 K/uL       ASSESSMENT AND PLAN:  Paranoid Schizophrenia, Deferred , Problems related to social environment  and 41-50 serious symptoms    RECOMMENDATIONS:  Continue current care  Positive COVID status.  Day 4 quarantine  Continue Risperdal liquid 2 mg PO BID  Disposition planning with social work  Thank you for this consultation    Sanjay Boss MD  2/3/2023

## 2023-02-03 NOTE — BSMART NOTE
BSMART Liaison Team Note     LOS:  2     Patient goal(s) for today: take medication as prescribed, make needs known in an appropriate manner, utilize coping skills  BSMART Liaison team focus/goals: assess needs, provide support and education, coordinate with care management    Progress note: Patient assessed face to face with Dr Vicente Fountain present. Patient reports that he is doing fair. He reports feeling confused. When asked what he was confused about he reports the voices in his head are causing his confusion. He was reminded that the medication he is taking is to help with this but it will take a few days to manage them better. Patient reports not issues with sleep or appetite. Patient denies SI and HI. Patient reports taking to his mother and feels supported. Patient was asked if he would like something to read and he was in agreement to receive a Bible that he initially asked for in the ER but then declined and declined yesterday when offered. Per 15 minute check notes patient did not sleep much as most states awake in bed and quiet or up in room pacing. Discussed with Dr Vicente Fountain as he ordered trazodone PRN. He will change this to scheduled nightly. Patient has not had much sleep at night per nursing over the last few days. No changes to other medications at this time. Patient had a meal tray at bedside but it was empty so unable to verify how much patient is eating as lack of food consumption was a concern in the ER. Barriers to Discharge: COVID+, Committed at TDO hearing on 2/1/23   Guns in the home: unknown      Outpatient provider(s):  None at this time. Previously Dr Jules Kline but this was more than 6 months ago. Once patient gets closer to baseline, discuss possibility of PHP after discharge.  Will need follow up with Dr Kathleen Simmons info/prescription coverage:  Medicaid     Diagnosis: Schizophrenia vs Schizoaffective Disorder          Past Medical History:   Diagnosis Date    Anxiety Psychosis ([facility] [address] 2015    Thyroid disease           Plan:  Due to COVID+ status, patient is exclusionary for all facilities and was admitted to the medical floor. Dr Derick Jiménez to adjust medication and order PRNs. Liaison to team to follow daily while admitted to [facility]. Patient committed at his TDO hearing. Follow up Psych Consult placed? no.   Psychiatrist updated?  yes         Participating treatment team members: Zaira Esquivel, Dr Anthony Weiss MD

## 2023-02-03 NOTE — PROGRESS NOTES
0130 Bedside shift change report given to [address] [place] [address] (oncoming nurse) by Jerome Chiang (offgoing nurse). Report included the following information SBAR, Kardex, Intake/Output, and MAR.     0500 blood sample drawn and sent to the lab.      0715 Bedside shift change report given to ILIANA Tavares (oncoming nurse) by [address] [place] [address] (offgoing nurse). Report included the following information SBAR, Kardex, Intake/Output, and MAR.

## 2023-02-04 PROCEDURE — 65270000032 HC RM SEMIPRIVATE

## 2023-02-04 PROCEDURE — 74011250637 HC RX REV CODE- 250/637: Performed by: PSYCHIATRY & NEUROLOGY

## 2023-02-04 PROCEDURE — 74011250637 HC RX REV CODE- 250/637: Performed by: EMERGENCY MEDICINE

## 2023-02-04 PROCEDURE — 74011250637 HC RX REV CODE- 250/637: Performed by: INTERNAL MEDICINE

## 2023-02-04 RX ADMIN — Medication 4 MG: at 05:52

## 2023-02-04 RX ADMIN — LEVOTHYROXINE SODIUM 25 MCG: 25 TABLET ORAL at 09:34

## 2023-02-04 RX ADMIN — Medication 4 MG: at 21:06

## 2023-02-04 RX ADMIN — RISPERIDONE 2 MG: 1 SOLUTION ORAL at 09:33

## 2023-02-04 RX ADMIN — Medication 4 MG: at 10:07

## 2023-02-04 RX ADMIN — OLANZAPINE 5 MG: 2.5 TABLET, FILM COATED ORAL at 05:34

## 2023-02-04 RX ADMIN — Medication 4 MG: at 18:48

## 2023-02-04 RX ADMIN — RISPERIDONE 2 MG: 1 SOLUTION ORAL at 17:41

## 2023-02-04 RX ADMIN — Medication 4 MG: at 03:50

## 2023-02-04 RX ADMIN — Medication 4 MG: at 13:56

## 2023-02-04 NOTE — PROGRESS NOTES
Problem: Loneliness or Risk for Loneliness  Goal: Demonstrate positive use of time alone when socialization is not possible  Outcome: Progressing Towards Goal     Problem: Risk for Elopement  Goal: Patient will not exit the unit/facility without proper escort  Outcome: Progressing Towards Goal     Problem: General Medical Care Plan  Goal: *Absence of infection signs and symptoms  Outcome: Progressing Towards Goal     Problem: Patient Education: Go to Patient Education Activity  Goal: Patient/Family Education  Outcome: Progressing Towards Goal     Problem: Suicide  Goal: *STG/LTG: Complies with medication therapy  Outcome: Progressing Towards Goal     Problem: General Medical Care Plan  Goal: *Anxiety reduced or absent  Outcome: Progressing Towards Goal

## 2023-02-04 NOTE — BSMART NOTE
BSMART Liaison Team Note     LOS:  3     Patient goal(s) for today: communicate needs to staff, continue prescribed medications  BSMART Liaison team focus/goals: assess MH needs, provide support and education    Progress note:   Committed at TDO hearing on 2/1/23   Pt is received sitting on the side of his bed eating lunch - sitter at doorway. He is alert and oriented, guarded, thoughts are moderately delusional and paranoid, constricted and sometimes bizarre affect, good eye contact, calm, cooperative, pleasant. When asked about his mood he responds \"For the most part, I'm OK\". He denies SI/HI/VH, reports the voices are better today and states \"As long as my voice is heard, I'm OK\". He explains that the voices tend to get worse when he is frustrated, especially when he feels people are not listening to him. Pt reports he needs his \"time to go play,\" clarifying when his mind is free of voices and \"his\" is the only one being heard. He reports worrying about his mother when he is not with her. He states he doesn't want to control her - he just feels the need to know where she is at all times and when he doesn't it creates anxiety for him. Pt expresses concern, several times, about his length of stay and states he may need the number to a  because he cannot afford to stay in the hospital for years. Liaison assured pt he will not be in the hospital for \"years\". 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 Liaison congratulated pt on his new business, provided a supportive and nonjudgmental presence, and ended the session while pt was in a positive mood about his business venture. Barriers to Discharge: COVID+, Committed at TDO hearing on 2/1/23   Guns in the home: unknown      Outpatient provider(s):  None at this time. Previously Dr Destiney Mckeon but this was more than 6 months ago.  Once patient gets closer to baseline, discuss possibility of PHP after discharge. Will need follow up with Dr Isacc Blanco. Insurance info/prescription coverage:  Medicaid     Diagnosis: Schizophrenia vs Schizoaffective Disorder    Plan:  Due to COVID+ status, patient is exclusionary for all facilities and was admitted to the medical floor. Liaison to team to follow daily while admitted to [facility]. Patient committed at his TDO hearing.   Follow up Psych Consult placed? no.   Psychiatrist updated? no      Participating treatment team members: Paulla Leyden, Darlyn Dustman, LCSW

## 2023-02-04 NOTE — PROGRESS NOTES
Bedside and Verbal shift change report given to Fatoumata Burger RN (oncoming nurse) by Jmaes Armstrong RN (offgoing nurse). Report included the following information SBAR, Kardex, and MAR. Vital Signs Last 24 Hrs  T(C): 36.3 (10 May 2022 08:39), Max: 36.6 (09 May 2022 15:30)  T(F): 97.4 (10 May 2022 08:39), Max: 97.9 (09 May 2022 15:30)  HR: 71 (10 May 2022 08:39) (71 - 79)  BP: 114/59 (10 May 2022 08:39) (103/62 - 119/61)  BP(mean): --  RR: 18 (10 May 2022 08:39) (18 - 18)  SpO2: 97% (10 May 2022 08:39) (97% - 100%)

## 2023-02-04 NOTE — PROGRESS NOTES
2284 Shift report given to Gabriel Galarza RN and Veronica Hardy LPN (oncoming) from Jose G Flores RN (offgoing). Report included the following: SBAR, MAR, Kardex, Intake/Output and Recent Results    0934 Scheduled meds admin    1007 Nicotine prn admin    1102 Pt in bed, sleeping    1132 Pt's mother dropped off package w/ snack, underwear & a radio (stated music soothes him at home). Package looked through & provided to pt     1202 scheduled nicotine patch admin    1356 Nicotine lozenge admin    1547 Pt in room, requested juices. KELLEE Bower 52 consult. 1741 scheduled meds admin. Pt in bed, resting    1851 admin prn nicotine lozenge, pt showering    1915 Bedside and Verbal shift change report given to ILIANA Marks (oncoming nurse) by Gabriel Galarza RN and Veronica Hardy LPN (offgoing nurse). Report included the following information SBAR, Kardex, MAR, and Recent Results.

## 2023-02-04 NOTE — PROGRESS NOTES
Bedside and Verbal shift change report given to Lorna Cheema RN (oncoming nurse) by Jessica Hadley RN (offgoing nurse). Report included the following information SBAR, Kardex, and MAR.

## 2023-02-04 NOTE — PROGRESS NOTES
Problem: Loneliness or Risk for Loneliness  Goal: Demonstrate positive use of time alone when socialization is not possible  Outcome: Progressing Towards Goal     Problem: Pain  Goal: *Control of Pain  Outcome: Progressing Towards Goal     Problem: Pain  Goal: *Control of Pain  Outcome: Progressing Towards Goal

## 2023-02-04 NOTE — CONSULTS
PSYCHIATRY CONSULT NOTE: In Person Consult    REASON FOR CONSULT: Acute psychosis      HISTORY OF PRESENTING COMPLAINT:  Sena Whaley is a 28 y.o. WHITE/NON- male who is currently admitted to the medical floor Covid 19 positive at [facility]. Patient is alert and orient x 4. Herbert Jane is still presenting in a delusional state. Reports feels his mother is trying to take away his business and that he is building a new real [employer] infrastructure for the [employer]. Herbert Jane paces throughout the interview with this provider. States his does not sleep well at home, that he needs to survey the house to keep the family safe and is angry because his parents have to firearms (guns) in the home and he does not know where they are located. Patient informs this provide intentions to purchase a gun to protect himself from whoever he needs protection from. Patient  states he became upset prior to this hospital admission due to rare odor in his home. Today states his is feeling good, sleep and appetite have improved. Endorses A/H. States the voice is one he has recorded in his brain and it tells him \" you are nothing, no one gives a \"F\" about you'. Erik VH. Denies SI or plan. Endorses HI with plan to buy a gun, does not detail any specific individual he would harm. PAST PSYCHIATRIC HISTORY and SUBSTANCE ABUSE HISTORY:  Paranoid schizophrenia  Tobacco use d/o  Multiple mental health hospitalizations  On Clazoril prior to admission      PAST MEDICAL HISTORY:    Please see H&P for details.      Past Medical History:   Diagnosis Date    Anxiety     Psychosis ([facility] [address] 2015    Thyroid disease      Prior to Admission medications    Not on File     Vitals:    02/04/23 0713 02/04/23 0828 02/04/23 1432 02/04/23 1520   BP: 127/73   119/75   Pulse: 90   99   Resp: 18   18   Temp: 97 °F (36.1 °C)   98.3 °F (36.8 °C)   SpO2: 100% 100%  100%   Weight:       Height:   5' 7\" (1.702 m)      Lab Results   Component Value Date/Time    WBC 6.0 02/03/2023 04:34 AM    HGB 15.9 02/03/2023 04:34 AM    HCT 46.6 02/03/2023 04:34 AM    PLATELET 880 87/86/1664 04:34 AM    MCV 91.6 02/03/2023 04:34 AM     Lab Results   Component Value Date/Time    Sodium 140 02/03/2023 04:34 AM    Potassium 4.1 02/03/2023 04:34 AM    Chloride 104 02/03/2023 04:34 AM    CO2 31 02/03/2023 04:34 AM    Anion gap 5 02/03/2023 04:34 AM    Glucose 88 02/03/2023 04:34 AM    Glucose 88 02/03/2023 04:34 AM    BUN 10 02/03/2023 04:34 AM    Creatinine 0.91 02/03/2023 04:34 AM    BUN/Creatinine ratio 11 (L) 02/03/2023 04:34 AM    Calcium 8.5 02/03/2023 04:34 AM    Bilirubin, total 0.8 02/03/2023 04:34 AM    Alk. phosphatase 75 02/03/2023 04:34 AM    Protein, total 7.2 02/03/2023 04:34 AM    Albumin 4.0 02/03/2023 04:34 AM    Globulin 3.2 02/03/2023 04:34 AM    A-G Ratio 1.3 02/03/2023 04:34 AM    ALT (SGPT) 20 02/03/2023 04:34 AM    AST (SGOT) 16 02/03/2023 04:34 AM     No results found for: VALF2, VALAC, VALP, VALPR, DS6, CRBAM, CRBAMP, CARB2, XCRBAM  No results found for: LITHM  RADIOLOGY REPORTS:(reviewed/updated 2/4/2023)  No results found. No results found for: Hubert Munoz [id number], [id number], [id number], PREGU, POCHCG, MHCGN, HCGQR, THCGA1, SHCG, HCGN, HCGSERUM, HCGURQLPOC    PSYCHOSOCIAL HISTORY:  Lives with parents  Single  Completed 9th grade  Unemployed      MENTAL STATUS EXAM:    General appearance: well   groomed, psychomotor activity is hyperactive  Eye contact: Avoids eye contact  Speech: Spontaneous, normal rate and tone. Affect : Bizarre   Mood: \"Feeling ready to go home \"  Thought Process: Logical, goal directed  Perception: Endorses AH and VH, Delusional.   Thought Content: Denies SI or Plan, Endorses HI with plan to get a gun  Insight: Poor  Judgement: Poor  Cognition: Intact grossly. ASSESSMENT AND PLAN:  Vera Mohan meets criteria for a diagnosis of  paranoid schizophrenia with psychosis .   Patient followed by Dr. Che Alexander outpatient. Recommending inpatient behavioral treatment, due to delusional state patient may require TDO in unvoluntary. Due to positive Covid status bed placement will be difficult. Continue 1:1  Advised to contact parents and inquire on the presence of guns in the home  Consult daily or as behavioral changes occur  Thank your your consult. Please feel free to consult us again as needed.

## 2023-02-04 NOTE — PROGRESS NOTES
[facility]  Hospitalist Group                                                                                          Hospitalist Progress Note  Bandar June MD  Answering service: [phone] OR [phone] from in house phone        Date of Service:  2023  NAME:  Vera Mohan  :  1995  MRN:  [id number]       Admission Summary:   From HPI: Vera Mohan is a 28 y.o. male is known past medical history of paranoid schizophrenia, hypothyroidism who presents to ED with acute psychosis, psychotic behavior, delusional due to patient stopped taking his home medication Clozaril. In the emergency department patient was evaluated, psychiatrist was consulted, recommended acute psychiatric admission. During work-up COVID test came back positive. The patient denied having any fever, shortness of breath, productive cough. He does not require any supplemental oxygen. Per psychiatry patient cannot be admitted to acute psychiatric unit due to he is COVID-positive and required to be on isolation. The patient does have auditory hallucination but denied-continue suicidal ideation. Medicine was consulted for admission and further evaluation. Interval history / Subjective:   NAD, no acute events over night  Comfortable, overall the patient is feeling better  Alert, oriented x3, appropriate  Having breakfast, good appetite  Sitter 1:1   Denies SI  No SOB, no CP, not requiring supplemental O2  The patient is on hold per psychiatrist    Psychiatrist consulted, discussed plan of care, input appreciated,   Risperdal was initiated per psychiatrist.  The patient is comfortable, feeling better   + auditory hallucinations     Assessment & Plan:     Paranoid schizophrenia with acute psychosis due to noncompliance with medication.    Discussed with psychiatrist, input appreciated,   Continue Risperdal per psych  Acute psychosis is better controlled   No SI  Continue current medications per psychiatrist  Pt wish to go home    COVID-19   Asymptomatic  Continue quarantine day # 5/10  Continue to monitor      Hypothyroidism  TSH WNL  FT4 1.6  Discussed with pharmacy, pt was not on levothyroxine recently,   Continue levothyroxine for now     Tobacco use  nicotine patch was applied  Nicotine gum PRN  Discussed with pt , advised to quit       Code status: Full code  Prophylaxis: Lovenox SQ  Care Plan discussed with: patient, RN, CM, psychiatrist, [facility]  Anticipated Disposition: per psychiatrist  Inpatient  Cardiac monitoring: None       Hospital Problems  Never Reviewed            Codes Class Noted POA    Acute psychosis ([facility] [address] ICD-10-CM: F23  ICD-9-CM: 298.9  2/1/2023 Unknown        COVID-19 ICD-10-CM: U07.1  ICD-9-CM: 079.89  2/1/2023 Unknown         Social Determinants of Health     Tobacco Use: High Risk    Smoking Tobacco Use: Every Day    Smokeless Tobacco Use: Never    Passive Exposure: Not on file   Alcohol Use: Not on file   Financial Resource Strain: Not on file   Food Insecurity: Not on file   Transportation Needs: Not on file   Physical Activity: Not on file   Stress: Not on file   Social Connections: Not on file   Intimate Partner Violence: Not on file   Depression: Not on file   Housing Stability: Not on file       Review of Systems:   A comprehensive review of systems was negative except for that written in the HPI. Vital Signs:    Last 24hrs VS reviewed since prior progress note.  Most recent are:  Visit Vitals  /73 (BP 1 Location: Right upper arm, BP Patient Position: At rest)   Pulse 90   Temp 97 °F (36.1 °C)   Resp 18   Ht 5' 7\" (1.702 m)   Wt 81.6 kg (180 lb)   SpO2 100%   BMI 28.19 kg/m²         Intake/Output Summary (Last 24 hours) at 2/4/2023 1133  Last data filed at 2/4/2023 0515  Gross per 24 hour   Intake 440 ml   Output --   Net 440 ml          Physical Examination:     I had a face to face encounter with this patient and independently examined them on 2/4/2023 as outlined below:          Constitutional:  No acute distress, cooperative, pleasant    ENT:  Oral mucosa moist, oropharynx benign. Resp:  CTA bilaterally. No wheezing/rhonchi/rales. No accessory muscle use. CV:  Regular rhythm, normal rate, no murmurs, gallops, rubs    GI:  Soft, non distended, non tender. normoactive bowel sounds, no hepatosplenomegaly     Musculoskeletal:  No edema, warm, 2+ pulses throughout    Neurologic:  Moves all extremities. AAOx3, CN II-XII reviewed            Data Review:    Review and/or order of clinical lab test    I have independently reviewed and interpreted patient's lab and all other diagnostic data    Labs:     Recent Labs     02/03/23 0434 02/02/23 0423   WBC 6.0 8.2   HGB 15.9 15.4   HCT 46.6 45.2    283       Recent Labs     02/03/23 0434 02/02/23 0423    139   K 4.1 3.8    105   CO2 31 26   BUN 10 8   CREA 0.91 0.86   GLU 88  88 106*   CA 8.5 8.5   MG  --  2.2       Recent Labs     02/03/23 0434 02/02/23 0423   ALT 20 19   AP 75 71   TBILI 0.8 1.2*   TP 7.2 6.9   ALB 4.0 3.8   GLOB 3.2 3.1       No results for input(s): INR, PTP, APTT, INREXT, INREXT in the last 72 hours. No results for input(s): FE, TIBC, PSAT, FERR in the last 72 hours. No results found for: FOL, RBCF   No results for input(s): PH, PCO2, PO2 in the last 72 hours. No results for input(s): CPK, CKNDX, TROIQ in the last 72 hours.     No lab exists for component: CPKMB  Lab Results   Component Value Date/Time    Cholesterol, total 138 02/03/2023 04:34 AM    HDL Cholesterol 43 02/03/2023 04:34 AM    LDL, calculated 79.2 02/03/2023 04:34 AM    Triglyceride 79 02/03/2023 04:34 AM    CHOL/HDL Ratio 3.2 02/03/2023 04:34 AM     No results found for: [facility]  Lab Results   Component Value Date/Time    Color YELLOW/STRAW 01/30/2023 12:07 PM    Appearance CLEAR 01/30/2023 12:07 PM    Specific gravity <1.005 01/30/2023 12:07 PM    pH (UA) 6.0 01/30/2023 12:07 PM    Protein Negative 01/30/2023 12:07 PM    Glucose Negative 01/30/2023 12:07 PM    Ketone 40 (A) 01/30/2023 12:07 PM    Bilirubin Negative 01/30/2023 12:07 PM    Urobilinogen 0.2 01/30/2023 12:07 PM    Nitrites Negative 01/30/2023 12:07 PM    Leukocyte Esterase Negative 01/30/2023 12:07 PM    Epithelial cells FEW 01/30/2023 12:07 PM    Bacteria Negative 01/30/2023 12:07 PM    WBC 0-4 01/30/2023 12:07 PM    RBC 0-5 01/30/2023 12:07 PM       Notes reviewed from all clinical/nonclinical/nursing services involved in patient's clinical care. Care coordination discussions were held with appropriate clinical/nonclinical/ nursing providers based on care coordination needs. Patients current active Medications were reviewed, considered, added and adjusted based on the clinical condition today. Home Medications were reconciled to the best of my ability given all available resources at the time of admission. Route is PO if not otherwise noted.       Medications Reviewed:     Current Facility-Administered Medications   Medication Dose Route Frequency    nicotine buccal (POLACRILEX) lozenge 4 mg  4 mg Oral Q2H PRN    hydrOXYzine HCL (ATARAX) tablet 25 mg  25 mg Oral Q4H PRN    nicotine (NICODERM CQ) 14 mg/24 hr patch 1 Patch  1 Patch TransDERmal Q24H    OLANZapine (ZyPREXA) tablet 5 mg  5 mg Oral Q6H PRN    haloperidol lactate (HALDOL) injection 5 mg  5 mg IntraMUSCular Q6H PRN    benztropine (COGENTIN) tablet 1 mg  1 mg Oral BID PRN    diphenhydrAMINE (BENADRYL) injection 50 mg  50 mg IntraMUSCular BID PRN    LORazepam (ATIVAN) injection 1 mg  1 mg IntraMUSCular Q4H PRN    traZODone (DESYREL) tablet 50 mg  50 mg Oral QHS PRN    magnesium hydroxide (MILK OF MAGNESIA) 400 mg/5 mL oral suspension 30 mL  30 mL Oral DAILY PRN    sodium chloride (NS) flush 5-40 mL  5-40 mL IntraVENous PRN    acetaminophen (TYLENOL) tablet 650 mg  650 mg Oral Q6H PRN    Or    acetaminophen (TYLENOL) suppository 650 mg  650 mg Rectal Q6H PRN polyethylene glycol (MIRALAX) packet 17 g  17 g Oral DAILY PRN    ondansetron (ZOFRAN ODT) tablet 4 mg  4 mg Oral Q8H PRN    Or    ondansetron (ZOFRAN) injection 4 mg  4 mg IntraVENous Q6H PRN    risperiDONE (RisperDAL) 1 mg/mL oral solution soln 2 mg  2 mg Oral BID    levothyroxine (SYNTHROID) tablet 25 mcg  25 mcg Oral DAILY     ______________________________________________________________________  EXPECTED LENGTH OF STAY: 6d 4h  ACTUAL LENGTH OF STAY:          3                 Ryan Eaton MD

## 2023-02-04 NOTE — PROGRESS NOTES
1334 Shift report given to David Durham RN and Rich Pillai LPN (oncoming) from ILIANA HERNANDES (offgoing). Report included the following: SBAR, MAR, Kardex, Intake/Output and Recent Results'    1132 Pt's mother dropped off package w/ snack, underwear & a radio (stated music soothes him at home). Package looked through & provided to pt     1915 Shift report given to ILIANA Marks  (oncoming) from David Durham RN and Rich Pillai LPN (offgoing). Report included the following: SBAR, MAR, Kardex, Intake/Output and Recent Results'    **Assessment & charting completed by Rich Pillai LPN.  Reviewed & verified by this RN **

## 2023-02-04 NOTE — PROGRESS NOTES
Pt awake and alert, verbal. He had 2 can sodas at bedside he did bring them to be thrown away, oncoming sitter made aware pt should not have cans but starfoam cups only.

## 2023-02-04 NOTE — PROGRESS NOTES
Pt awake, requested a nicotine lozenges. Pt stating \"I  cant wait to go home so that I can have a smoke\". Pt made aware that he is able to have his lozenges q2 hrs. It was written on his white board as a reminder.  Sitter present

## 2023-02-04 NOTE — PROGRESS NOTES
Comprehensive Nutrition Assessment    Type and Reason for Visit: (P) Initial    Nutrition Recommendations/Plan:   Diet as tolerated         Nutrition Assessment:    (P) Pt isolated on medical floor with covid. Nutritionally and medically stable. Nutrition Related Findings:    (P) Pt tolerating diet. Wound Type: (P) None    Current Nutrition Intake & Therapies:  Average Meal Intake: (P) %  Average Supplement Intake: (P) None ordered  ADULT DIET Regular    Anthropometric Measures:  Height: (P) 5' 7\" (170.2 cm)  Ideal Body Weight (IBW): (P) 148 lbs ((P) 67 kg)     Current Body Wt:  (P) 81.6 kg (180 lb), (P) 121.6 % IBW. (P) Stated  Current BMI (kg/m2): (P) 28.2        Weight Adjustment: (P) No adjustment                 BMI Category: (P) Overweight (BMI 25.0-29. 9)    Estimated Daily Nutrient Needs:  Energy Requirements Based On: (P) Kcal/kg  Weight Used for Energy Requirements: (P) Current  Energy (kcal/day): (P) 2050  Weight Used for Protein Requirements: (P) Current  Protein (g/day): (P) 82  Method Used for Fluid Requirements: (P) 1 ml/kcal  Fluid (ml/day): (P) 2050    Nutrition Diagnosis:   (P) No nutrition diagnosis at this time related to   as evidenced by      Nutrition Interventions:   Food and/or Nutrient Delivery: (P) Continue current diet  Nutrition Education/Counseling: (P) No recommendations at this time  Coordination of Nutrition Care: (P) No recommendation at this time       Goals:  Previous Goal Met: (P) Progressing toward goal(s)  Goals: (P) Meet at least 75% of estimated needs, prior to discharge       Nutrition Monitoring and Evaluation:   Behavioral-Environmental Outcomes: (P) None identified  Food/Nutrient Intake Outcomes: (P) Food and nutrient intake, Supplement intake  Physical Signs/Symptoms Outcomes: (P) None identified    Discharge Planning:    (P) Too soon to determine    Jose Plata, PhD, [address] [place] [address]   Contact: [phone]

## 2023-02-05 PROCEDURE — 74011250637 HC RX REV CODE- 250/637: Performed by: EMERGENCY MEDICINE

## 2023-02-05 PROCEDURE — 65270000032 HC RM SEMIPRIVATE

## 2023-02-05 PROCEDURE — 74011250637 HC RX REV CODE- 250/637: Performed by: INTERNAL MEDICINE

## 2023-02-05 PROCEDURE — 74011250637 HC RX REV CODE- 250/637: Performed by: PSYCHIATRY & NEUROLOGY

## 2023-02-05 PROCEDURE — 74011250637 HC RX REV CODE- 250/637

## 2023-02-05 RX ORDER — CALCIUM CARBONATE 200(500)MG
200 TABLET,CHEWABLE ORAL
Status: DISCONTINUED | OUTPATIENT
Start: 2023-02-05 | End: 2023-02-06 | Stop reason: HOSPADM

## 2023-02-05 RX ORDER — CALCIUM CARBONATE 200(500)MG
400 TABLET,CHEWABLE ORAL ONCE
Status: COMPLETED | OUTPATIENT
Start: 2023-02-05 | End: 2023-02-05

## 2023-02-05 RX ADMIN — Medication 4 MG: at 22:55

## 2023-02-05 RX ADMIN — LEVOTHYROXINE SODIUM 25 MCG: 25 TABLET ORAL at 09:31

## 2023-02-05 RX ADMIN — Medication 4 MG: at 09:31

## 2023-02-05 RX ADMIN — RISPERIDONE 2 MG: 1 SOLUTION ORAL at 09:31

## 2023-02-05 RX ADMIN — Medication 4 MG: at 14:16

## 2023-02-05 RX ADMIN — RISPERIDONE 2 MG: 1 SOLUTION ORAL at 17:20

## 2023-02-05 RX ADMIN — Medication 4 MG: at 18:29

## 2023-02-05 RX ADMIN — CALCIUM CARBONATE (ANTACID) CHEW TAB 500 MG 400 MG: 500 CHEW TAB at 01:49

## 2023-02-05 NOTE — PROGRESS NOTES
Problem: Loneliness or Risk for Loneliness  Goal: Demonstrate positive use of time alone when socialization is not possible  Outcome: Progressing Towards Goal     Problem: Risk for Elopement  Goal: Patient will not exit the unit/facility without proper escort  Outcome: Progressing Towards Goal     Problem: General Medical Care Plan  Goal: *Anxiety reduced or absent  Outcome: Progressing Towards Goal

## 2023-02-05 NOTE — PROGRESS NOTES
0969 Shift report given to Sita Urbina RN and Chandni Lovett LPN (oncoming) from ILIANA Marks (offgoing). Report included the following: SBAR, MAR, Kardex, and Intake/Output. 6794 Shift report given to Darrian Oglesby RN  (oncoming) from Sita Urbina RN and Chandni Lovett LPN (offgoing). Report included the following: SBAR, MAR, Kardex, and Intake/Output. **Assessment & charting completed by Chandni Lovett LPN.  Reviewed & verified by this RN **

## 2023-02-05 NOTE — PROGRESS NOTES
[facility]  Hospitalist Group                                                                                          Hospitalist Progress Note  Desiree Avalos MD  Answering service: [phone] from in house phone        Date of Service:  2023  NAME:  Lorin Smith  :  1995  MRN:  [id number]       Admission Summary:   From HPI: Lorin Smith is a 28 y.o. male is known past medical history of paranoid schizophrenia, hypothyroidism who presents to ED with acute psychosis, psychotic behavior, delusional due to patient stopped taking his home medication Clozaril. In the emergency department patient was evaluated, psychiatrist was consulted, recommended acute psychiatric admission. During work-up COVID test came back positive. The patient denied having any fever, shortness of breath, productive cough. He does not require any supplemental oxygen. Per psychiatry patient cannot be admitted to acute psychiatric unit due to he is COVID-positive and required to be on isolation. The patient does have auditory hallucination but denied-continue suicidal ideation. Medicine was consulted for admission and further evaluation. Interval history / Subjective:   NAD, no acute events over night  Comfortable, walking in the room,   The patient is feeling better  Alert, oriented x3, appropriate  Good appetite  Sitter 1:1   Denies SI  No SOB, no CP, not requiring supplemental O2  The patient is on hold per psychiatrist    Psychiatrist consulted, discussed plan of care, input appreciated,   Risperdal was initiated per psychiatrist.  The patient is comfortable, feeling better   + auditory hallucinations  Discussed with psychiatrist and reviewed psychiatric notes.   Per psychiatrist the patient is in acute psychosis and will need to remained in the hospital, continue TDO     Assessment & Plan:     Paranoid schizophrenia with acute psychosis due to noncompliance with medication. Discussed with psychiatrist, input appreciated,   Continue Risperdal per psych  Acute psychosis is better controlled   No SI  Continue current medications per psychiatrist  Pt wish to go home  Discussed with psychiatrist, per psychiatrist the patient is still in acute psychosis, TDO in place    COVID-19   Asymptomatic  Continue quarantine day # 6/10  Continue to monitor      Hypothyroidism  TSH WNL  FT4 1.6  Discussed with pharmacy, pt was not on levothyroxine recently,   Discussed with the patient he informed writer that he needs to continue levothyroxine  Continue levothyroxine for now     Tobacco use  nicotine patch was applied  Nicotine gum PRN  Discussed with pt , advised to quit       Code status: Full code  Prophylaxis: Lovenox SQ  Care Plan discussed with: patient, RN, CM, psychiatrist, [address] [place] [address]  Anticipated Disposition: per psychiatrist, possible pt will be transferred to acute psychiatric unit after quarantine completed  Inpatient  Cardiac monitoring: None       Hospital Problems  Never Reviewed            Codes Class Noted POA    Acute psychosis ([facility] [address] ICD-10-CM: F23  ICD-9-CM: 298.9  2/1/2023 Unknown        COVID-19 ICD-10-CM: U07.1  ICD-9-CM: 079.89  2/1/2023 Unknown         Social Determinants of Health     Tobacco Use: High Risk    Smoking Tobacco Use: Every Day    Smokeless Tobacco Use: Never    Passive Exposure: Not on file   Alcohol Use: Not on file   Financial Resource Strain: Not on file   Food Insecurity: Not on file   Transportation Needs: Not on file   Physical Activity: Not on file   Stress: Not on file   Social Connections: Not on file   Intimate Partner Violence: Not on file   Depression: Not on file   Housing Stability: Not on file       Review of Systems:   A comprehensive review of systems was negative except for that written in the HPI. Vital Signs:    Last 24hrs VS reviewed since prior progress note.  Most recent are:  Visit Vitals  /73 (BP 1 Location: Right upper arm, BP Patient Position: At rest)   Pulse 97   Temp 97.7 °F (36.5 °C)   Resp 18   Ht 5' 7\" (1.702 m)   Wt 81.6 kg (180 lb)   SpO2 100%   BMI 28.19 kg/m²       No intake or output data in the 24 hours ending 02/05/23 1351       Physical Examination:     I had a face to face encounter with this patient and independently examined them on 2/5/2023 as outlined below:          Constitutional:  No acute distress, cooperative, pleasant    ENT:  Oral mucosa moist, oropharynx benign. Resp:  CTA bilaterally. No wheezing/rhonchi/rales. No accessory muscle use. CV:  Regular rhythm, normal rate, no murmurs, gallops, rubs    GI:  Soft, non distended, non tender. normoactive bowel sounds, no hepatosplenomegaly     Musculoskeletal:  No edema, warm, 2+ pulses throughout    Neurologic:  Moves all extremities. AAOx3, CN II-XII reviewed            Data Review:    Review and/or order of clinical lab test    I have independently reviewed and interpreted patient's lab and all other diagnostic data    Labs:     Recent Labs     02/03/23  0434   WBC 6.0   HGB 15.9   HCT 46.6          Recent Labs     02/03/23  0434      K 4.1      CO2 31   BUN 10   CREA 0.91   GLU 88  88   CA 8.5       Recent Labs     02/03/23  0434   ALT 20   AP 75   TBILI 0.8   TP 7.2   ALB 4.0   GLOB 3.2       No results for input(s): INR, PTP, APTT, INREXT, INREXT in the last 72 hours. No results for input(s): FE, TIBC, PSAT, FERR in the last 72 hours. No results found for: FOL, RBCF   No results for input(s): PH, PCO2, PO2 in the last 72 hours. No results for input(s): CPK, CKNDX, TROIQ in the last 72 hours.     No lab exists for component: CPKMB  Lab Results   Component Value Date/Time    Cholesterol, total 138 02/03/2023 04:34 AM    HDL Cholesterol 43 02/03/2023 04:34 AM    LDL, calculated 79.2 02/03/2023 04:34 AM    Triglyceride 79 02/03/2023 04:34 AM    CHOL/HDL Ratio 3.2 02/03/2023 04:34 AM     No results found for: [facility]  Lab Results   Component Value Date/Time    Color YELLOW/STRAW 01/30/2023 12:07 PM    Appearance CLEAR 01/30/2023 12:07 PM    Specific gravity <1.005 01/30/2023 12:07 PM    pH (UA) 6.0 01/30/2023 12:07 PM    Protein Negative 01/30/2023 12:07 PM    Glucose Negative 01/30/2023 12:07 PM    Ketone 40 (A) 01/30/2023 12:07 PM    Bilirubin Negative 01/30/2023 12:07 PM    Urobilinogen 0.2 01/30/2023 12:07 PM    Nitrites Negative 01/30/2023 12:07 PM    Leukocyte Esterase Negative 01/30/2023 12:07 PM    Epithelial cells FEW 01/30/2023 12:07 PM    Bacteria Negative 01/30/2023 12:07 PM    WBC 0-4 01/30/2023 12:07 PM    RBC 0-5 01/30/2023 12:07 PM       Notes reviewed from all clinical/nonclinical/nursing services involved in patient's clinical care. Care coordination discussions were held with appropriate clinical/nonclinical/ nursing providers based on care coordination needs. Patients current active Medications were reviewed, considered, added and adjusted based on the clinical condition today. Home Medications were reconciled to the best of my ability given all available resources at the time of admission. Route is PO if not otherwise noted.       Medications Reviewed:     Current Facility-Administered Medications   Medication Dose Route Frequency    calcium carbonate (TUMS) chewable tablet 200 mg [elemental]  200 mg Oral BID PRN    nicotine buccal (POLACRILEX) lozenge 4 mg  4 mg Oral Q2H PRN    hydrOXYzine HCL (ATARAX) tablet 25 mg  25 mg Oral Q4H PRN    nicotine (NICODERM CQ) 14 mg/24 hr patch 1 Patch  1 Patch TransDERmal Q24H    OLANZapine (ZyPREXA) tablet 5 mg  5 mg Oral Q6H PRN    haloperidol lactate (HALDOL) injection 5 mg  5 mg IntraMUSCular Q6H PRN    benztropine (COGENTIN) tablet 1 mg  1 mg Oral BID PRN    diphenhydrAMINE (BENADRYL) injection 50 mg  50 mg IntraMUSCular BID PRN    LORazepam (ATIVAN) injection 1 mg  1 mg IntraMUSCular Q4H PRN    traZODone (DESYREL) tablet 50 mg  50 mg Oral QHS PRN    magnesium hydroxide (MILK OF MAGNESIA) 400 mg/5 mL oral suspension 30 mL  30 mL Oral DAILY PRN    sodium chloride (NS) flush 5-40 mL  5-40 mL IntraVENous PRN    acetaminophen (TYLENOL) tablet 650 mg  650 mg Oral Q6H PRN    Or    acetaminophen (TYLENOL) suppository 650 mg  650 mg Rectal Q6H PRN    polyethylene glycol (MIRALAX) packet 17 g  17 g Oral DAILY PRN    ondansetron (ZOFRAN ODT) tablet 4 mg  4 mg Oral Q8H PRN    Or    ondansetron (ZOFRAN) injection 4 mg  4 mg IntraVENous Q6H PRN    risperiDONE (RisperDAL) 1 mg/mL oral solution soln 2 mg  2 mg Oral BID    levothyroxine (SYNTHROID) tablet 25 mcg  25 mcg Oral DAILY     ______________________________________________________________________  EXPECTED LENGTH OF STAY: 6d 4h  ACTUAL LENGTH OF STAY:          4                 Sherif Hurtado MD

## 2023-02-05 NOTE — PROGRESS NOTES
Patient called out requesting \"hot meal\", nurse had previously brought patient 2 frozen dinners since 1930. Nurse questioned the patient regarding if he was feeling anxious or unable to sleep. Patient refused any medication to help with anxiety or to help with sleeping and states\" I just need you to do your job\".

## 2023-02-05 NOTE — PROGRESS NOTES
Problem: Loneliness or Risk for Loneliness  Goal: Demonstrate positive use of time alone when socialization is not possible  Outcome: Progressing Towards Goal     Problem: Risk for Elopement  Goal: Patient will not exit the unit/facility without proper escort  Outcome: Progressing Towards Goal

## 2023-02-05 NOTE — PROGRESS NOTES
0710 Verbal shift change report given to Dami Ambrosio, ILIANA and Oni Nuñez LPN (oncoming nurse) by Sujey Quinones RN (offgoing nurse). Report included the following information SBAR, Kardex, MAR, and Recent Results. [id number] Scheduled meds administrated    1030  Pt in room, on phone    91 21 06 replaced nicotine patch    1219 Pt requested Tums to settle stomach, gypsy NELSON \" Pt asking for an order of tums to settle his stomach\"    1300 Pt sleeping in bed. 1416 nicotine lozenge administered    1611 Pt resting in bed    1720 Pt eating supper tray. Scheduled meds admin.

## 2023-02-06 VITALS
HEIGHT: 67 IN | DIASTOLIC BLOOD PRESSURE: 69 MMHG | HEART RATE: 76 BPM | TEMPERATURE: 99.3 F | OXYGEN SATURATION: 95 % | SYSTOLIC BLOOD PRESSURE: 111 MMHG | RESPIRATION RATE: 18 BRPM | BODY MASS INDEX: 28.25 KG/M2 | WEIGHT: 180 LBS

## 2023-02-06 PROCEDURE — 74011250637 HC RX REV CODE- 250/637: Performed by: EMERGENCY MEDICINE

## 2023-02-06 PROCEDURE — 74011250637 HC RX REV CODE- 250/637: Performed by: INTERNAL MEDICINE

## 2023-02-06 PROCEDURE — 74011250637 HC RX REV CODE- 250/637: Performed by: PSYCHIATRY & NEUROLOGY

## 2023-02-06 RX ORDER — LEVOTHYROXINE SODIUM 25 UG/1
25 TABLET ORAL DAILY
Qty: 30 TABLET | Refills: 1 | Status: SHIPPED | OUTPATIENT
Start: 2023-02-07

## 2023-02-06 RX ORDER — RISPERIDONE 1 MG/ML
2 SOLUTION ORAL 2 TIMES DAILY
Qty: 120 ML | Refills: 1 | Status: SHIPPED | OUTPATIENT
Start: 2023-02-06

## 2023-02-06 RX ADMIN — RISPERIDONE 2 MG: 1 SOLUTION ORAL at 17:17

## 2023-02-06 RX ADMIN — Medication 4 MG: at 13:50

## 2023-02-06 RX ADMIN — Medication 4 MG: at 10:47

## 2023-02-06 RX ADMIN — Medication 4 MG: at 06:38

## 2023-02-06 RX ADMIN — LEVOTHYROXINE SODIUM 25 MCG: 25 TABLET ORAL at 08:20

## 2023-02-06 RX ADMIN — RISPERIDONE 2 MG: 1 SOLUTION ORAL at 08:19

## 2023-02-06 NOTE — PROGRESS NOTES
AVS reviewed with the patient. Prescription information was highlighted and pt demonstrated evidence of learning. Additionally, all discharge instructions were reviewed by Donalee Kehr. from [facility] and she reviewed the discharge plan with pts mother.

## 2023-02-06 NOTE — PROGRESS NOTES
Problem: Airway Clearance - Ineffective  Goal: Achieve or maintain patent airway  Outcome: Progressing Towards Goal     Problem: Loneliness or Risk for Loneliness  Goal: Demonstrate positive use of time alone when socialization is not possible  Outcome: Progressing Towards Goal     Problem: Risk for Elopement  Goal: Patient will not exit the unit/facility without proper escort  Outcome: Progressing Towards Goal     Problem: General Medical Care Plan  Goal: *Vital signs within specified parameters  Outcome: Progressing Towards Goal  Goal: *Absence of infection signs and symptoms  Outcome: Progressing Towards Goal  Goal: *Optimal pain control at patient's stated goal  Outcome: Progressing Towards Goal  Goal: *Skin integrity maintained  Outcome: Progressing Towards Goal  Goal: *Fluid volume balance  Outcome: Progressing Towards Goal  Goal: *Optimize nutritional status  Outcome: Progressing Towards Goal  Goal: *Anxiety reduced or absent  Outcome: Progressing Towards Goal  Goal: *Progressive mobility and function (eg: ADL's)  Outcome: Progressing Towards Goal     Problem: Patient Education: Go to Patient Education Activity  Goal: Patient/Family Education  Outcome: Progressing Towards Goal     Problem: Suicide  Goal: *STG: Remains safe in hospital  Outcome: Progressing Towards Goal  Goal: *STG: Seeks staff when feelings of self harm or harm towards others arise  Outcome: Progressing Towards Goal  Goal: *STG: Attends activities and groups  Outcome: Progressing Towards Goal  Goal: *STG:  Verbalizes alternative ways of dealing with maladaptive feelings/behaviors  Outcome: Progressing Towards Goal  Goal: *STG/LTG: Complies with medication therapy  Outcome: Progressing Towards Goal  Goal: *STG/LTG: No longer expresses self destructive or suicidal thoughts  Outcome: Progressing Towards Goal  Goal: *LTG:  Identifies available community resources  Outcome: Progressing Towards Goal  Goal: *LTG:  Develops proactive suicide prevention plan  Outcome: Progressing Towards Goal  Goal: Interventions  Outcome: Progressing Towards Goal     Problem: Patient Education: Go to Patient Education Activity  Goal: Patient/Family Education  Outcome: Progressing Towards Goal     Problem: Falls - Risk of  Goal: *Absence of Falls  Description: Document Trace Murray Fall Risk and appropriate interventions in the flowsheet.   Outcome: Progressing Towards Goal  Note: Fall Risk Interventions:       Mentation Interventions: Adequate sleep, hydration, pain control, Evaluate medications/consider consulting pharmacy, Family/sitter at bedside, Reorient patient, Update white board    Medication Interventions: Evaluate medications/consider consulting pharmacy                   Problem: Patient Education: Go to Patient Education Activity  Goal: Patient/Family Education  Outcome: Progressing Towards Goal

## 2023-02-06 NOTE — DISCHARGE INSTRUCTIONS
- Follow-up with psychiatry for further management of schizophrenia  -Follow-up with PCP  for the management of hypothyroidism

## 2023-02-06 NOTE — PROGRESS NOTES
1918  Shift change report received from (off nurse). Report included review of SBAR, accordion report, results review, orders, meds, ROS, and POC. All questions answered. Transfer of care complete. 1920  Leeann Callejas, [facility] safety sitter, at bedside. Reviewed with Leeann Callejas that pt is NOT under SI precautions but requires Q15min checks for safety. Sitter form filled out and signed by this RN and Leeann Callejas (sitter).

## 2023-02-06 NOTE — PROGRESS NOTES
Care plan reviewed.     Problem: Airway Clearance - Ineffective  Goal: Achieve or maintain patent airway  2/6/2023 0333 by Asia Do  Outcome: Progressing Towards Goal  2/6/2023 0333 by Asia Do  Outcome: Progressing Towards Goal     Problem: Loneliness or Risk for Loneliness  Goal: Demonstrate positive use of time alone when socialization is not possible  2/6/2023 0333 by Asia Do  Outcome: Progressing Towards Goal  2/6/2023 0333 by Asia Do  Outcome: Progressing Towards Goal     Problem: Risk for Elopement  Goal: Patient will not exit the unit/facility without proper escort  2/6/2023 0333 by Asia Do  Outcome: Progressing Towards Goal  2/6/2023 0333 by Asia Do  Outcome: Progressing Towards Goal     Problem: Pain  Goal: *Control of Pain  2/6/2023 0333 by Asia Do  Outcome: Resolved/Met  2/6/2023 0333 by Asia Do  Outcome: Progressing Towards Goal     Problem: Patient Education: Go to Patient Education Activity  Goal: Patient/Family Education  2/6/2023 0333 by Asia Do  Outcome: Resolved/Met  2/6/2023 0333 by Asia Do  Outcome: Progressing Towards Goal     Problem: General Medical Care Plan  Goal: *Vital signs within specified parameters  2/6/2023 0333 by Asia Do  Outcome: Progressing Towards Goal  2/6/2023 0333 by Asia Do  Outcome: Progressing Towards Goal  Goal: *Absence of infection signs and symptoms  2/6/2023 0333 by Asia Do  Outcome: Progressing Towards Goal  2/6/2023 0333 by Asia Do  Outcome: Progressing Towards Goal  Goal: *Optimal pain control at patient's stated goal  2/6/2023 0333 by Asia Do  Outcome: Progressing Towards Goal  2/6/2023 0333 by Asia Do  Outcome: Progressing Towards Goal  Goal: *Skin integrity maintained  2/6/2023 0333 by Asia Do  Outcome: Progressing Towards Goal  2/6/2023 0333 by Asia Do  Outcome: Progressing Towards Goal  Goal: *Fluid volume balance  2/6/2023 0333 by Asia Do  Outcome: Progressing Towards Goal  2/6/2023 0333 by Silvia Gramanda  Outcome: Progressing Towards Goal  Goal: *Optimize nutritional status  2/6/2023 0333 by Silvia Gramanda  Outcome: Progressing Towards Goal  2/6/2023 0333 by Silvia Grise  Outcome: Progressing Towards Goal  Goal: *Anxiety reduced or absent  2/6/2023 0333 by Silvia Grise  Outcome: Progressing Towards Goal  2/6/2023 0333 by Silvia Gramanda  Outcome: Progressing Towards Goal  Goal: *Progressive mobility and function (eg: ADL's)  2/6/2023 0333 by Silvia Gramanda  Outcome: Progressing Towards Goal  2/6/2023 0333 by Silvia Grise  Outcome: Progressing Towards Goal     Problem: Patient Education: Go to Patient Education Activity  Goal: Patient/Family Education  2/6/2023 0333 by Silvia Rebollar  Outcome: Progressing Towards Goal  2/6/2023 0333 by Silvia Grise  Outcome: Progressing Towards Goal     Problem: Suicide  Goal: *STG: Remains safe in hospital  2/6/2023 0333 by Silvia Rebollar  Outcome: Progressing Towards Goal  2/6/2023 0333 by Silvia Gramanda  Outcome: Progressing Towards Goal  Goal: *STG: Seeks staff when feelings of self harm or harm towards others arise  2/6/2023 0333 by Silvia Rebollar  Outcome: Progressing Towards Goal  2/6/2023 0333 by Silvia Gramadna  Outcome: Progressing Towards Goal  Goal: *STG: Attends activities and groups  2/6/2023 0333 by Silvia Rebollar  Outcome: Progressing Towards Goal  2/6/2023 0333 by Silvia Gramanda  Outcome: Progressing Towards Goal  Goal: *STG:  Verbalizes alternative ways of dealing with maladaptive feelings/behaviors  2/6/2023 0333 by Silvia Rebollar  Outcome: Progressing Towards Goal  2/6/2023 0333 by Silvia Gramanda  Outcome: Progressing Towards Goal  Goal: *STG/LTG: Complies with medication therapy  2/6/2023 0333 by Silvia Rebollar  Outcome: Progressing Towards Goal  2/6/2023 0333 by Silvia Gramanda  Outcome: Progressing Towards Goal  Goal: *STG/LTG: No longer expresses self destructive or suicidal thoughts  2/6/2023 0333 by Bre Celeste Cecil  Outcome: Progressing Towards Goal  2/6/2023 0333 by Naty Tom  Outcome: Progressing Towards Goal  Goal: *LTG:  Identifies available community resources  2/6/2023 0333 by Naty Tom  Outcome: Progressing Towards Goal  2/6/2023 0333 by Naty Tom  Outcome: Progressing Towards Goal  Goal: *LTG:  Develops proactive suicide prevention plan  2/6/2023 0333 by Naty Tom  Outcome: Progressing Towards Goal  2/6/2023 0333 by Naty Tom  Outcome: Progressing Towards Goal  Goal: Interventions  2/6/2023 0333 by Naty Tom  Outcome: Progressing Towards Goal  2/6/2023 0333 by Naty Tom  Outcome: Progressing Towards Goal     Problem: Patient Education: Go to Patient Education Activity  Goal: Patient/Family Education  2/6/2023 0333 by Naty Tom  Outcome: Progressing Towards Goal  2/6/2023 0333 by Naty Tom  Outcome: Progressing Towards Goal     Problem: Falls - Risk of  Goal: *Absence of Falls  Description: Document Kallie Snow Fall Risk and appropriate interventions in the flowsheet.   2/6/2023 0333 by Naty Tom  Outcome: Progressing Towards Goal  Note: Fall Risk Interventions:       Mentation Interventions: Adequate sleep, hydration, pain control, Evaluate medications/consider consulting pharmacy, Family/sitter at bedside, Reorient patient, Update white board    Medication Interventions: Evaluate medications/consider consulting pharmacy                2/6/2023 5134 by Naty Tom  Outcome: Progressing Towards Goal  Note: Fall Risk Interventions:       Mentation Interventions: Adequate sleep, hydration, pain control, Evaluate medications/consider consulting pharmacy, Family/sitter at bedside, Reorient patient, Update white board    Medication Interventions: Evaluate medications/consider consulting pharmacy                   Problem: Patient Education: Go to Patient Education Activity  Goal: Patient/Family Education  2/6/2023 0333 by Naty Tom  Outcome: Progressing Towards Goal  2/6/2023 0323 by Patt Shirley  Outcome: Progressing Towards Goal

## 2023-02-06 NOTE — PROGRESS NOTES
0735-Bedside and Verbal shift change report given to christopher (oncoming nurse) by Collin Johnson (offgoing nurse). Report included the following information SBAR, Kardex, MAR, Accordion, and Recent Results. 3552- Patient seen and assessed. Patient had no complaints at this time. Patient requested new pillow case and water. Provided to patient. Patient is currently denying SI/HI and states that \"I don't understand why I'm here, I called the police because I felt unsafe at home. I am ready to leave now\". 1429- Patient sleeping     1030- patient called out wanting to leave the hospital. Therapist Nenita Ray at bedside speaking with patient. 1047- given patient a nicotine lozenge    1119- patient pacing in room     1130- patient resting in bed     1230- Pt resting in position of comfort with call bell in reach. Pt updated on plan of care and has no additional concerns at this time. 1350- Pt resting in position of comfort with call bell in reach. Pt updated on plan of care and has no additional concerns at this time. 1430- Pt resting in position of comfort with call bell in reach. Pt updated on plan of care and has no additional concerns at this time. 1530- Pt resting in position of comfort with call bell in reach. Pt updated on plan of care and has no additional concerns at this time.

## 2023-02-06 NOTE — BSMART NOTE
BSMART Liaison Team Note     LOS:  5     Patient goal(s) for today: take medication as prescribed, make needs known in an appropriate manner, utilize coping skills  BSMART Liaison team focus/goals: assess needs, provide support and education, coordinate with care management    Progress note: Patient assessed face to face. Patient up and pleasant. He shook the treatment teams hands. He reports feeling good and denies any issues or concerns with his medications. He reports that he feels like he is good to go home and return to work. He reports needing to work to feel stable. He shared that he is a  and shared what this means. He reports needing to work on new projects. He shared that he is sleeping and eating okay. He denies SI, HI, and hallucinations. He did report no hallucinations \"right now\" and acknowledged that he does hear voices at times. He reports talking to himself and having conversations in his head. Patient reports even on medications he has them but that are calmer and he an focus more. Patient denies that the voices are command in nature. He reports no issues or concerns. He reports that he would like to be tested for diabetes. He reports that this is a concern for him. This writer checked his lab work and all glucose levels have been WNL. Patient reassured of lab values. 10:30 am: Nurse Andrez Chilel reports that patient is requesting AMA discharge. Explained that patient is committed and can not leave unless discharged by Dr Doreen Michel and/or Dr Benny Tong. Went and spoke with patient. He is upset about being in the hospital. He reports not understanding why he is here and he was reminded that he was not well. Patient was reminded that he was having trouble with his thoughts being confused and feeling paranoid per his own report to the team. He reported he needed to get back on his medications. He shared that he was feeling better and did not feel he needed to be here.  He reports needing to go home and smoke. He shared that he smokes a pack a day but did not have a patch or lozenge. Patient was reminded that he can not leave at this time as Dr Aldo Cox is monitoring him on the medication so that he can go home and work like he reports wanting to. This writer checked the [facility] and patient is ordered for a daily patch as well as lozenges PRN q 2 hours. Spoke with nurse who will bring a lozenge to patient to see if this decreases his irritability and desire to leave. 10:45 am: Called and spoke with mother as patient gave this wrtier verbal permission during rounds this mronng. Ms Felicia Solis confirms that she has been talking to the patient on the phone. Explained that he is demanding to leave and he has been committed. She reports talking to him this morning and she reports he does not understand why he was admitted and feels like it is due to his smoking cigarettes. She was informed that this writer attempted to reoriented patient to situation but is he focused on discharging. Explained patient has been medication compliant but Dr Aldo Cox to continue to monitor. Discussed patient's progress. Mother reports patient is doing much better than on admission. She reports that he is not at baseline but he is closer and that at baseline he has slight paranoia and hallucinations. She reports her concern is for medication compliance. Shared that he is taking 2mgs of Risperdal BID. She reports that he did fairly well on the Clozaril but that the blood draws were the issue. Explained this was the reason for Risperdal as there are no blood draws necessary. Mother asked about an injection the patient reported receiving. This was explained to be Lovenox and that it was given as a precaution by the medical floor and then discontinued as patient is moving around his room and not sitting sedentary in his bed. She reported that he thought he had diabetes now because he was getting a shot.  Explained that this was not true and this writer and Dr Navi Bledsoe explained this to him today. She reports that she feels safe if he were to come home. She reports that he is definitely doing better. Discussed the question of guns at home. There ARE guns at home but they are locked in a safe in a locked room. Patient does not have access to the 1 key for the room and the safe uses fingerprints and patient is not a registered fingerprint. Mother reports she and her  have talked about moving the guns temporarily to another family members house and are still considering this but that the patient has no way to access the guns behind 2 locked doors. Discussed that this writer would pass the information discussed on to the psychiatrist and medical team. Will call mother back is plan for discharge changes. At this time discharge is tentatively planned for middle to end of the week. 11 am: Dr Navi Bledsoe updated on the above information. 2:25 pm: Received call from [employer] that she spoke with Dr Nvai Bledsoe who reports patient can be discharged if he has follow up from a psychiatric standpoint. Dr Buffy Bolanos reports patient is cleared for discharge as well. Called [facility] to schedule follow up with Dr An Zamora and staff report that patient has not been seen in a long time and that he would be a new patient. After being transferred to multiple office staff, they reported Dr An Zamora is not accepting new patients. Office staff recommended finding a new psychiatrist or having Dr Navi Bledsoe talk to Dr An Zamora. 2:45 pm: Sent a message to Dr Navi Bledsoe regarding this. Contacted patient's mother and informed her that patient would be discharging today if she was still in agreement. She reports she is and could come after work. Explained possible issue with psychiatrist but that this writer would let her know as soon as possible once an appointment is scheduled and patient has been approved for discharge.  Mother reports if needed a new provider is okay but that patient has paranoia at baseline so getting him to talk to the new provider may be difficult. 3:19 pm: Dr Ad Bender was able to speak to Dr Joseph Daily who agreed to see the patient Wednesday next week. This writer called back and spoke with [facility] Office staff and they scheduled the appointment for 3:45 pm. They have the mother's number if anything changes. This writer to fax discharge paperwork tomorrow. 3:24 pm: Spoke with care manager Fabby. She just needs the name of the PCP to schedule follow up appointment. Will discuss when this writer calls mother back with discharge time. 3:42 pm: Spoke with mother Brant. Explained that Dr Joseph Daily agreed to schedule patient and he has an appointment next week. She reports that the patient does not have a PCP and will go to [facility] when he has to but does not like to go because of his paranoia. She asked that patient be informed he needs to follow up with his appointments for psych and thyriod as part of his discharge plan. Mother reports thyroid physician is Dr Will Whaley and this was added to the follow up plans. Patient understands he needs to take his medications and follow up. Mother to pick patient up a little before 6 pm as she gets off work at 5 pm. She reports receiving an alert that his medication is at [facility] and filled. She denies any questions or concerns. She will come to the ER entrance and call the unit to tell them she is here so patient can be walked down. Nursing staff, nursing supervisor, care [address], and Dr Meng Arcos informed of discharge time and that all appointments are noted in chart. This writer informed the patient that he would be discharging today and the time. He thanked this writer and apologized for his irritation earlier today. He reports that he is hungry and is glad to get dinner before he leaves. This writer was informed by his mother that he will be impatient and watching the clock.  Verified the time on the clock on the wall was accurate and patient thanked this writer. Carlitos is aware of plans for discharge at 6 pm to provide support if patient gets anxious or impatient. Barriers to Discharge: COVID+, Committed at TDO hearing on 2/1/23   Guns in the home: YES      Outpatient provider(s):  None at this time. Previously Dr Luis Payton but this was more than 6 months ago. Will need follow up with Dr Nadia Duffy info/prescription coverage:  Medicaid     Diagnosis: Schizophrenia vs Schizoaffective Disorder          Past Medical History:   Diagnosis Date    Anxiety      Psychosis ([facility] [address] 2015    Thyroid disease           Plan:  Due to COVID+ status, patient is exclusionary for all facilities and was admitted to the medical floor. Dr Brendan Fraser to adjust medication and order PRNs. Liaison to team to follow daily while admitted to [address] [place] [address]. Patient committed at his TDO hearing. Discharge patient at 6 pm into the care of his mother. Follow up Psych Consult placed? no.   Psychiatrist updated?  yes         Participating treatment team members: Keith Dumont, Dr Leonora Edmond MD

## 2023-02-06 NOTE — CONSULTS
PSYCHIATRY CONSULT NOTE:    REASON FOR CONSULT:  psychotic behavior  Delusional    HISTORY OF PRESENTING COMPLAINT:  Paulla Leyden is a 28 y.o. male who presents as an ECO here for medical clearance. Patient's mother issued a ECO stating that patient had been very paranoid and stopped taking his Clozaril. She states with the Clozaril patient had to get regular lab checks and he started to feel like people were poisoning his blood. She also reports patient called the police recently stating he had blood all over his sheets and that his parents had killed people and were eating them. Police did not take him anywhere as he was not a threat to himself    Paulla Leyden reports feeling that he could not eat with his hand cuffed to the bed (food partially eaten). Poor historian, he believes he is in a cell due to the hand cuffs. Notes dealing with mental health concerns for 16+ years. Denies SI/HI/AH/VH. Wants to read a Bible and any books to quiet the voice he occasionally hears. No aggression or violence. Appropriately interactive and limited awareness. Tolerating medications well. Eating and sleeping fairly. 2/1 - Paulla Leyden Is a little better today and reports feeling well and moods are good. Mild and guarded and mildly suspicious. Denies SI/HI/AH/VH. No aggression or violence. Appropriately interactive and aware. Tolerating medications well. Eating better as yesterday he ate nothing and sleeping fairly. Wants to get back work. Spoke with Dr. Isacc Blanco who notes he did best on Clozaril, but faired well with Risperdal.  Discussed his fears with blood draws from Clozaril. Ok with current treatment plan. 2/2 - Paulla Leyden reports feeling better than yesterday. Thoughts are more fluid though still delayed. Paranoid about his family but wants them to visit. Guarded about his family concerns. Denies SI/HI/AH/VH. No aggression or violence. Interactive and aware. Tolerating medications well. Eating and sleeping fairly. 2/3 - Rhunette Fears reports feeling alright and moods are good. Less paranoid however remains guarded. Says he slept well, however the nursing reports demonstrate that he is awake through the night during checks. Denies SI/HI/VH. Notes the voices are problematic. No aggression or violence. Appropriately interactive and aware. Tolerating medications well. Refused Risperdal yesterday, but took it this morning. Eating fairly. May consider court ordered medications if he is inconsistent with treatments    2/6 - Rhunette Fears reports feeling well and moods are good. Denies SI/HI/AH/VH. No aggression or violence. 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 Appropriately interactive and partially aware. Mild delusions at baseline per mothers report to social work. He is near baseline and quarantine completion. Tolerating medications well. Eating and sleeping fairly. PAST PSYCHIATRIC HISTORY:  In Patient Paranoid Schizophrenia on Clozaril. Followed outpatient with Dr. Jerson Varma over 6 months ago. SUBSTANCE ABUSE HISTORY:  Social History     Substance and Sexual Activity   Drug Use Not on file     Social History     Substance and Sexual Activity   Alcohol Use Yes    Comment: 6-12 beers/week     Social History     Tobacco Use   Smoking Status Every Day    Types: Cigarettes   Smokeless Tobacco Never       PAST MEDICAL HISTORY:  Past Medical History:   Diagnosis Date    Anxiety     Psychosis ([facility] [address] 2015    Thyroid disease        SOCIAL HISTORY:  Lives with Mother and father where mother is primary supporter.       VITALS:  Visit Vitals  /77 (BP 1 Location: Left upper arm, BP Patient Position: At rest;Sitting)   Pulse 91   Temp 98 °F (36.7 °C)   Resp 18   Ht 5' 7\" (1.702 m)   Wt 81.6 kg (180 lb)   SpO2 99%   BMI 28.19 kg/m² MEDICATIONS:    Current Facility-Administered Medications:     calcium carbonate (TUMS) chewable tablet 200 mg [elemental], 200 mg, Oral, BID PRN, Afua Roman MD    nicotine buccal (POLACRILEX) lozenge 4 mg, 4 mg, Oral, Q2H PRN, Afua Roman MD, 4 mg at 02/06/23 1047    hydrOXYzine HCL (ATARAX) tablet 25 mg, 25 mg, Oral, Q4H PRN, Nba Ribeiro MD, 25 mg at 02/03/23 2035    nicotine (NICODERM CQ) 14 mg/24 hr patch 1 Patch, 1 Patch, TransDERmal, Q24H, Nba Palmer MD, 1 Patch at 02/05/23 1207    OLANZapine (ZyPREXA) tablet 5 mg, 5 mg, Oral, Q6H PRN, Garima Landin MD, 5 mg at 02/04/23 0534    haloperidol lactate (HALDOL) injection 5 mg, 5 mg, IntraMUSCular, Q6H PRN, Garima Landin MD    benztropine (COGENTIN) tablet 1 mg, 1 mg, Oral, BID PRN, Garima Landin MD    diphenhydrAMINE (BENADRYL) injection 50 mg, 50 mg, IntraMUSCular, BID PRN, Garima Landni MD    LORazepam (ATIVAN) injection 1 mg, 1 mg, IntraMUSCular, Q4H PRN, Garima Landin MD    traZODone (DESYREL) tablet 50 mg, 50 mg, Oral, QHS PRN, Garima Landin MD    magnesium hydroxide (MILK OF MAGNESIA) 400 mg/5 mL oral suspension 30 mL, 30 mL, Oral, DAILY PRN, Garima Landin MD    sodium chloride (NS) flush 5-40 mL, 5-40 mL, IntraVENous, PRN, Nba Palmer MD    acetaminophen (TYLENOL) tablet 650 mg, 650 mg, Oral, Q6H PRN **OR** acetaminophen (TYLENOL) suppository 650 mg, 650 mg, Rectal, Q6H PRN, Nba Palmer MD    polyethylene glycol (MIRALAX) packet 17 g, 17 g, Oral, DAILY PRN, Nba Palmer MD    ondansetron (ZOFRAN ODT) tablet 4 mg, 4 mg, Oral, Q8H PRN **OR** ondansetron (ZOFRAN) injection 4 mg, 4 mg, IntraVENous, Q6H PRN, Nba Palmer MD    risperiDONE (RisperDAL) 1 mg/mL oral solution soln 2 mg, 2 mg, Oral, BID, Karla Smith MD, 2 mg at 02/06/23 0819    levothyroxine (SYNTHROID) tablet 25 mcg, 25 mcg, Oral, DAILY, Jose Castellanos MD, 25 mcg at 02/06/23 0820    MENTAL STATUS EXAM:  Calm and cooperative more organized. Oriented in all spheres  Denies SI/HI/AH/VH  Paranoid  Disorganized    No results found for this or any previous visit (from the past 24 hour(s)). ASSESSMENT AND PLAN:  Paranoid Schizophrenia, Deferred , Problems related to social environment  and 41-50 serious symptoms    RECOMMENDATIONS:  Continue current care  Positive COVID status. Day 8 quarantine  Continue Risperdal liquid 2 mg PO BID  Disposition planning with social work for home when quarantine completed.   Thank you for this consultation    Lanie Najera MD  2/6/2023

## 2023-02-06 NOTE — DISCHARGE SUMMARY
Hospitalist Discharge Summary     Patient ID:  Margaret Bernstein  [id number]  27 y.o.  1995    PCP on record: None    Admit date: 1/30/2023  Discharge date and time: 2/6/2023    Please note that this dictation was completed with [employer], the [employer] voice recognition software. Quite often unanticipated grammatical, syntax, homophones, and other interpretive errors are inadvertently transcribed by the computer software. Please disregard these errors. Please excuse any errors that have escaped final proofreading. Admission Diagnoses: Acute psychosis ([address] [F23]  COVID-19 [U07.1]    Discharge Diagnoses: Active Problems:    Acute psychosis ([address] (2/1/2023)      COVID-19 (2/1/2023)           Hospital Course:     #Paranoid schizophrenia with acute psychosis resolved  -Patient was assessed by  psychiatry and was cleared for discharge per psychiatry. Patient to discharge home on Risperdal 2 mg twice daily and follow-up with the psychiatry for further management     #COVID-19  -No oxygen requirements       #Hypothyroidism  -Continue levothyroxine  -Follow with PCP      CONSULTATIONS:  IP CONSULT TO PSYCHIATRY  IP CONSULT TO PSYCHIATRY  IP CONSULT TO PSYCHIATRY  IP CONSULT TO PSYCHIATRY  IP CONSULT TO PSYCHIATRY  IP CONSULT TO PSYCHIATRY  IP CONSULT TO PSYCHIATRY    Excerpted HPI from H&P of Samanta Prakash MD:  Margaret Bernstein is a 28 y.o. male is known past medical history of paranoid schizophrenia, hypothyroidism who presents to ED with acute psychosis, psychotic behavior, delusional due to patient stopped taking his home medication Clozaril. In the emergency department patient was evaluated, psychiatrist was consulted, recommended acute psychiatric admission. During work-up COVID test came back positive. The patient denied having any fever, shortness of breath, productive cough. He does not require any supplemental oxygen.   Per psychiatry patient cannot be admitted to acute psychiatric unit due to he is COVID-positive and required to be on isolation. The patient does have auditory hallucination but denied-continue suicidal ideation. Medicine was consulted for admission and further evaluation. The patient denies any headache, blurry vision, sore throat, trouble swallowing, trouble with speech, chest pain, SOB, cough, fever, chills, N/V/D, abd pain, urinary symptoms, constipation, recent travels, sick contacts, focal or generalized neurological symptoms, falls, injuries, rashes, hematemesis, melena, hemoptysis, hematuria, rashes, denies starting any new medications and denies any other concerns or problems besides as mentioned above.      ______________________________________________________________________  DISCHARGE SUMMARY/HOSPITAL COURSE:  for full details see H&P, daily progress notes, labs, consult notes. _______________________________________________________________________  Patient seen and examined by me on discharge day. Pertinent Findings:  Gen:    Not in distress  Chest: Clear lungs  CVS:   Regular rhythm. No edema  Abd:  Soft, not distended, not tender  Neuro:    Oriented to person, place, and time   _______________________________________________________________________  DISCHARGE MEDICATIONS:   Current Discharge Medication List        START taking these medications    Details   risperiDONE (RisperDAL) 1 mg/mL oral solution Take 2 mL by mouth two (2) times a day. Qty: 120 mL, Refills: 1  Start date: 2/6/2023           CONTINUE these medications which have CHANGED    Details   levothyroxine (SYNTHROID) 25 mcg tablet Take 1 Tablet by mouth daily. Qty: 30 Tablet, Refills: 1  Start date: 2/7/2023             My Recommended Diet, Activity, Wound Care, and follow-up labs are listed in the patient's Discharge Insturctions which I have personally completed and reviewed.     _______________________________________________________________________  DISPOSITION: Home with Family: x   Home with HH/PT/OT/RN:    SNF/LTC:    IVONNE:    OTHER:        Condition at Discharge:  Stable  _______________________________________________________________________  Follow up with:   PCP : None  Follow-up Information       Follow up With Specialties Details Why Contact Info    [facility]  Follow up   [facility]  [phone]                Total time in minutes spent coordinating this discharge (includes going over instructions, follow-up, prescriptions, and preparing report for sign off to her PCP) :  55 minutes    Signed:  Agustina Acevedo MD

## 2023-02-06 NOTE — PROGRESS NOTES
Discharge instructions were given to the patient by Abdifatah Esteban RN      The patient scheduled to leave the medical /surgical floor at 1800. Provided with all belongings locked in closet    Pt is ambulatory, alert and oriented and in no acute distress with 2 prescriptions. The patient was encouraged to call or return to the ED for worsening issues or problems and was encouraged to schedule a follow up appointment for continuing care. The patient verbalized understanding of discharge instructions and prescriptions, all questions were answered. The patient has no further concerns at this time.

## 2023-02-06 NOTE — PROGRESS NOTES
Hospitalist Progress Note    NAME: Ava Cano   :  1995   MRN:  [id number]   Room Number:  243/68  @ [facility]     Please note that this dictation was completed with Brisa Leo, the computer voice recognition software. Quite often unanticipated grammatical, syntax, homophones, and other interpretive errors are inadvertently transcribed by the computer software. Please disregard these errors. Please excuse any errors that have escaped final proofreading. Interim Hospital Summary: 28 y.o. male whom presented on 2023 with      Assessment / Plan:  Anticipated discharge date : TBD  Anticipated disposition : psych   Barriers to discharge : COVID +     #Paranoid schizophrenia with acute psychosis  -Every 15 minutes behavioral health check  -Psychiatry consulted on board  -Continue Risperdal per psychiatry  -Patient committed per courts     #COVID-19  -No oxygen requirements  -Continue precautions    #Hypothyroidism  -Continue levothyroxine      Code status: Full  Prophylaxis: Lovenox  Recommended Disposition:  psych      Subjective:     Chief Complaint / Reason for Physician Visit  \"Pt wants to leave \". Discussed with RN events overnight. Review of Systems:  No fevers, chills, appetite change, cough, sputum production, shortness of breath, dyspnea on exertion, nausea, vomitting, diarrhea, constipation, chest pain, leg edema, abdominal pain, joint pain, rash, itching. Tolerating PT/OT. Tolerating diet. Objective:     VITALS:   Last 24hrs VS reviewed since prior progress note. Most recent are:  Patient Vitals for the past 24 hrs:   Temp Pulse Resp BP SpO2   23 0719 98 °F (36.7 °C) 91 18 127/77 99 %   23 0405 97.7 °F (36.5 °C) 84 16 122/76 100 %   23 97.7 °F (36.5 °C) 90 18 129/72 100 %   23 1524 97 °F (36.1 °C) 95 18 137/73 100 %     No intake or output data in the 24 hours ending 23 0908     PHYSICAL EXAM:  General: WD, WN.  Alert, cooperative, no acute distress    EENT:  EOMI. Anicteric sclerae. MMM  Resp:  CTA bilaterally, no wheezing or rales. No accessory muscle use  CV:  Regular  rhythm,  normal S1/S2, no murmurs rubs gallops, No edema  GI:  Soft, Non distended, Non tender. +Bowel sounds  Neurologic:  Alert and oriented X 3, normal speech,   Psych:    Not anxious nor agitated  Skin:  No rashes. No jaundice    Reviewed most current lab test results and cultures  YES  Reviewed most current radiology test results   YES  Review and summation of old records today    NO  Reviewed patient's current orders and MAR    YES  PMH/SH reviewed - no change compared to H&P  ________________________________________________________________________  Care Plan discussed with:    Comments   Patient x    Family      RN x    Care Manager x    Consultant  x                     x Multidiciplinary team rounds were held today with , nursing, pharmacist and clinical coordinator. Patient's plan of care was discussed; medications were reviewed and discharge planning was addressed. ________________________________________________________________________  Total NON critical care TIME:  25   Minutes    Total CRITICAL CARE TIME Spent:   Minutes non procedure based      Comments   >50% of visit spent in counseling and coordination of care x    ________________________________________________________________________  Damaris Kohler MD     Procedures: see electronic medical records for all procedures/Xrays and details which were not copied into this note but were reviewed prior to creation of Plan. LABS:  I reviewed today's most current labs and imaging studies. Pertinent labs include:  No results for input(s): WBC, HGB, HCT, PLT, HGBEXT, HCTEXT, PLTEXT in the last 72 hours. No results for input(s): NA, K, CL, CO2, GLU, BUN, CREA, CA, MG, PHOS, ALB, TBIL, TBILI, ALT, INR, INREXT in the last 72 hours.     No lab exists for component: SGOT    Signed: Kennedi Weber MD

## 2023-02-07 ENCOUNTER — TELEPHONE (OUTPATIENT)
Dept: CASE MANAGEMENT | Age: 28
End: 2023-02-07

## 2023-02-07 NOTE — TELEPHONE ENCOUNTER
CM called patient by telephone to perform post discharge assessment and for the purpose of follow up call from inpatient discharge to check on environmental challenges/medications/appointment follow up/and questions/concerns. The call was answered by patient/family/caretaker/agency, introduction self, and explanation and reason for call, name and  confirmed. Patient states he is doing must better took his medication this morning, Per mother request appointment was not schedule for patient on discharge they will follow-up themselves.          [address], [address] [phone]

## 2023-02-07 NOTE — PROGRESS NOTES
AGUSTIN     RUR  11 %     IDR rounds this am with MD and team   SID 24-hours   [facility] and Psych assessed [facility] to call mother    Please see BSMART note   Mother to provide transportation home  Pscych appointment on AVS   Mother to follow-up with PCP as needed - did not want us to make appointment. CM to do follow-up call     Care Management Interventions  PCP Verified by CM: Yes (to arrange follow-up)  Mode of Transport at Discharge:  Other (see comment) (Mother )  Transition of Care Consult (CM Consult): Discharge Planning, Transportation Assistance, Medication Assistance  Support Systems: Parent(s), Other (Comment)  Confirm Follow Up Transport: Family  The Plan for Transition of Care is Related to the Following Treatment Goals : [address] and Medical  The Patient and/or Patient Representative was Provided with a Choice of Provider and Agrees with the Discharge Plan?: Yes  Name of the Patient Representative Who was Provided with a Choice of Provider and Agrees with the Discharge Plan: MD, Nursing, [facility]SM[facility],  Mother,  [place] of Choice List was Provided with Basic Dialogue that Supports the Patient's Individualized Plan of Care/Goals, Treatment Preferences and Shares the Quality Data Associated with the Providers?: Yes  Discharge Location  Patient Expects to be Discharged to[de-identified] Home with outpatient services      [address] MSW RN   [phone]